# Patient Record
Sex: FEMALE | Race: BLACK OR AFRICAN AMERICAN | NOT HISPANIC OR LATINO | URBAN - METROPOLITAN AREA
[De-identification: names, ages, dates, MRNs, and addresses within clinical notes are randomized per-mention and may not be internally consistent; named-entity substitution may affect disease eponyms.]

---

## 2017-11-09 ENCOUNTER — EMERGENCY (EMERGENCY)
Facility: HOSPITAL | Age: 32
LOS: 0 days | Discharge: HOME | End: 2017-11-09

## 2017-11-09 DIAGNOSIS — M79.671 PAIN IN RIGHT FOOT: ICD-10-CM

## 2017-11-09 DIAGNOSIS — Z87.891 PERSONAL HISTORY OF NICOTINE DEPENDENCE: ICD-10-CM

## 2017-11-09 DIAGNOSIS — M10.9 GOUT, UNSPECIFIED: ICD-10-CM

## 2018-09-24 ENCOUNTER — OUTPATIENT (OUTPATIENT)
Dept: OUTPATIENT SERVICES | Facility: HOSPITAL | Age: 33
LOS: 1 days | Discharge: HOME | End: 2018-09-24

## 2018-09-24 DIAGNOSIS — F32.9 MAJOR DEPRESSIVE DISORDER, SINGLE EPISODE, UNSPECIFIED: ICD-10-CM

## 2018-09-24 DIAGNOSIS — F41.1 GENERALIZED ANXIETY DISORDER: ICD-10-CM

## 2018-10-26 ENCOUNTER — OUTPATIENT (OUTPATIENT)
Dept: OUTPATIENT SERVICES | Facility: HOSPITAL | Age: 33
LOS: 1 days | Discharge: HOME | End: 2018-10-26

## 2018-10-26 DIAGNOSIS — F41.1 GENERALIZED ANXIETY DISORDER: ICD-10-CM

## 2018-10-26 DIAGNOSIS — F32.9 MAJOR DEPRESSIVE DISORDER, SINGLE EPISODE, UNSPECIFIED: ICD-10-CM

## 2018-11-30 ENCOUNTER — OUTPATIENT (OUTPATIENT)
Dept: OUTPATIENT SERVICES | Facility: HOSPITAL | Age: 33
LOS: 1 days | Discharge: HOME | End: 2018-11-30

## 2018-11-30 DIAGNOSIS — F41.1 GENERALIZED ANXIETY DISORDER: ICD-10-CM

## 2018-11-30 DIAGNOSIS — F32.9 MAJOR DEPRESSIVE DISORDER, SINGLE EPISODE, UNSPECIFIED: ICD-10-CM

## 2018-12-24 ENCOUNTER — OUTPATIENT (OUTPATIENT)
Dept: OUTPATIENT SERVICES | Facility: HOSPITAL | Age: 33
LOS: 1 days | Discharge: HOME | End: 2018-12-24

## 2018-12-24 DIAGNOSIS — F41.1 GENERALIZED ANXIETY DISORDER: ICD-10-CM

## 2018-12-24 DIAGNOSIS — F32.9 MAJOR DEPRESSIVE DISORDER, SINGLE EPISODE, UNSPECIFIED: ICD-10-CM

## 2019-01-04 ENCOUNTER — OUTPATIENT (OUTPATIENT)
Dept: OUTPATIENT SERVICES | Facility: HOSPITAL | Age: 34
LOS: 1 days | Discharge: HOME | End: 2019-01-04

## 2019-01-04 DIAGNOSIS — F32.9 MAJOR DEPRESSIVE DISORDER, SINGLE EPISODE, UNSPECIFIED: ICD-10-CM

## 2019-01-04 DIAGNOSIS — F41.1 GENERALIZED ANXIETY DISORDER: ICD-10-CM

## 2019-01-11 ENCOUNTER — OUTPATIENT (OUTPATIENT)
Dept: OUTPATIENT SERVICES | Facility: HOSPITAL | Age: 34
LOS: 1 days | Discharge: HOME | End: 2019-01-11

## 2019-01-11 DIAGNOSIS — F32.9 MAJOR DEPRESSIVE DISORDER, SINGLE EPISODE, UNSPECIFIED: ICD-10-CM

## 2019-01-11 DIAGNOSIS — F41.1 GENERALIZED ANXIETY DISORDER: ICD-10-CM

## 2019-01-18 ENCOUNTER — OUTPATIENT (OUTPATIENT)
Dept: OUTPATIENT SERVICES | Facility: HOSPITAL | Age: 34
LOS: 1 days | Discharge: HOME | End: 2019-01-18

## 2019-01-18 DIAGNOSIS — F32.9 MAJOR DEPRESSIVE DISORDER, SINGLE EPISODE, UNSPECIFIED: ICD-10-CM

## 2019-01-18 DIAGNOSIS — F41.1 GENERALIZED ANXIETY DISORDER: ICD-10-CM

## 2019-01-25 ENCOUNTER — OUTPATIENT (OUTPATIENT)
Dept: OUTPATIENT SERVICES | Facility: HOSPITAL | Age: 34
LOS: 1 days | Discharge: HOME | End: 2019-01-25

## 2019-01-25 DIAGNOSIS — F41.1 GENERALIZED ANXIETY DISORDER: ICD-10-CM

## 2019-01-25 DIAGNOSIS — F32.9 MAJOR DEPRESSIVE DISORDER, SINGLE EPISODE, UNSPECIFIED: ICD-10-CM

## 2019-02-01 ENCOUNTER — OUTPATIENT (OUTPATIENT)
Dept: OUTPATIENT SERVICES | Facility: HOSPITAL | Age: 34
LOS: 1 days | Discharge: HOME | End: 2019-02-01

## 2019-02-01 DIAGNOSIS — F32.9 MAJOR DEPRESSIVE DISORDER, SINGLE EPISODE, UNSPECIFIED: ICD-10-CM

## 2019-02-01 DIAGNOSIS — F41.1 GENERALIZED ANXIETY DISORDER: ICD-10-CM

## 2019-02-08 ENCOUNTER — OUTPATIENT (OUTPATIENT)
Dept: OUTPATIENT SERVICES | Facility: HOSPITAL | Age: 34
LOS: 1 days | Discharge: HOME | End: 2019-02-08

## 2019-02-08 DIAGNOSIS — F32.9 MAJOR DEPRESSIVE DISORDER, SINGLE EPISODE, UNSPECIFIED: ICD-10-CM

## 2019-02-08 DIAGNOSIS — F41.1 GENERALIZED ANXIETY DISORDER: ICD-10-CM

## 2019-02-22 ENCOUNTER — OUTPATIENT (OUTPATIENT)
Dept: OUTPATIENT SERVICES | Facility: HOSPITAL | Age: 34
LOS: 1 days | Discharge: HOME | End: 2019-02-22

## 2019-02-22 DIAGNOSIS — F32.9 MAJOR DEPRESSIVE DISORDER, SINGLE EPISODE, UNSPECIFIED: ICD-10-CM

## 2019-02-22 DIAGNOSIS — F41.1 GENERALIZED ANXIETY DISORDER: ICD-10-CM

## 2019-03-01 ENCOUNTER — OUTPATIENT (OUTPATIENT)
Dept: OUTPATIENT SERVICES | Facility: HOSPITAL | Age: 34
LOS: 1 days | Discharge: HOME | End: 2019-03-01

## 2019-03-01 DIAGNOSIS — F41.1 GENERALIZED ANXIETY DISORDER: ICD-10-CM

## 2019-03-01 DIAGNOSIS — F32.9 MAJOR DEPRESSIVE DISORDER, SINGLE EPISODE, UNSPECIFIED: ICD-10-CM

## 2019-03-08 ENCOUNTER — OUTPATIENT (OUTPATIENT)
Dept: OUTPATIENT SERVICES | Facility: HOSPITAL | Age: 34
LOS: 1 days | Discharge: HOME | End: 2019-03-08

## 2019-03-08 DIAGNOSIS — F41.1 GENERALIZED ANXIETY DISORDER: ICD-10-CM

## 2019-03-08 DIAGNOSIS — F32.9 MAJOR DEPRESSIVE DISORDER, SINGLE EPISODE, UNSPECIFIED: ICD-10-CM

## 2019-03-22 ENCOUNTER — OUTPATIENT (OUTPATIENT)
Dept: OUTPATIENT SERVICES | Facility: HOSPITAL | Age: 34
LOS: 1 days | Discharge: HOME | End: 2019-03-22

## 2019-03-22 DIAGNOSIS — F41.1 GENERALIZED ANXIETY DISORDER: ICD-10-CM

## 2019-03-22 DIAGNOSIS — F32.9 MAJOR DEPRESSIVE DISORDER, SINGLE EPISODE, UNSPECIFIED: ICD-10-CM

## 2019-03-29 ENCOUNTER — OUTPATIENT (OUTPATIENT)
Dept: OUTPATIENT SERVICES | Facility: HOSPITAL | Age: 34
LOS: 1 days | Discharge: HOME | End: 2019-03-29

## 2019-03-29 DIAGNOSIS — F41.1 GENERALIZED ANXIETY DISORDER: ICD-10-CM

## 2019-03-29 DIAGNOSIS — F32.9 MAJOR DEPRESSIVE DISORDER, SINGLE EPISODE, UNSPECIFIED: ICD-10-CM

## 2019-04-05 ENCOUNTER — OUTPATIENT (OUTPATIENT)
Dept: OUTPATIENT SERVICES | Facility: HOSPITAL | Age: 34
LOS: 1 days | Discharge: HOME | End: 2019-04-05

## 2019-04-05 DIAGNOSIS — F41.1 GENERALIZED ANXIETY DISORDER: ICD-10-CM

## 2019-04-05 DIAGNOSIS — F32.9 MAJOR DEPRESSIVE DISORDER, SINGLE EPISODE, UNSPECIFIED: ICD-10-CM

## 2019-04-12 ENCOUNTER — OUTPATIENT (OUTPATIENT)
Dept: OUTPATIENT SERVICES | Facility: HOSPITAL | Age: 34
LOS: 1 days | Discharge: HOME | End: 2019-04-12

## 2019-04-12 DIAGNOSIS — F32.9 MAJOR DEPRESSIVE DISORDER, SINGLE EPISODE, UNSPECIFIED: ICD-10-CM

## 2019-04-12 DIAGNOSIS — F41.1 GENERALIZED ANXIETY DISORDER: ICD-10-CM

## 2019-05-03 ENCOUNTER — OUTPATIENT (OUTPATIENT)
Dept: OUTPATIENT SERVICES | Facility: HOSPITAL | Age: 34
LOS: 1 days | Discharge: HOME | End: 2019-05-03

## 2019-05-03 DIAGNOSIS — F41.1 GENERALIZED ANXIETY DISORDER: ICD-10-CM

## 2019-05-03 DIAGNOSIS — F32.9 MAJOR DEPRESSIVE DISORDER, SINGLE EPISODE, UNSPECIFIED: ICD-10-CM

## 2019-05-10 ENCOUNTER — OUTPATIENT (OUTPATIENT)
Dept: OUTPATIENT SERVICES | Facility: HOSPITAL | Age: 34
LOS: 1 days | Discharge: HOME | End: 2019-05-10

## 2019-05-10 DIAGNOSIS — F41.1 GENERALIZED ANXIETY DISORDER: ICD-10-CM

## 2019-05-10 DIAGNOSIS — F32.9 MAJOR DEPRESSIVE DISORDER, SINGLE EPISODE, UNSPECIFIED: ICD-10-CM

## 2019-05-17 ENCOUNTER — OUTPATIENT (OUTPATIENT)
Dept: OUTPATIENT SERVICES | Facility: HOSPITAL | Age: 34
LOS: 1 days | Discharge: HOME | End: 2019-05-17
Payer: MEDICAID

## 2019-05-17 DIAGNOSIS — F43.10 POST-TRAUMATIC STRESS DISORDER, UNSPECIFIED: ICD-10-CM

## 2019-05-17 DIAGNOSIS — F33.1 MAJOR DEPRESSIVE DISORDER, RECURRENT, MODERATE: ICD-10-CM

## 2019-05-17 PROCEDURE — 99214 OFFICE O/P EST MOD 30 MIN: CPT

## 2019-05-24 ENCOUNTER — OUTPATIENT (OUTPATIENT)
Dept: OUTPATIENT SERVICES | Facility: HOSPITAL | Age: 34
LOS: 1 days | Discharge: HOME | End: 2019-05-24

## 2019-05-24 DIAGNOSIS — F43.10 POST-TRAUMATIC STRESS DISORDER, UNSPECIFIED: ICD-10-CM

## 2019-05-24 DIAGNOSIS — F33.1 MAJOR DEPRESSIVE DISORDER, RECURRENT, MODERATE: ICD-10-CM

## 2019-06-14 ENCOUNTER — OUTPATIENT (OUTPATIENT)
Dept: OUTPATIENT SERVICES | Facility: HOSPITAL | Age: 34
LOS: 1 days | Discharge: HOME | End: 2019-06-14

## 2019-06-14 DIAGNOSIS — F43.10 POST-TRAUMATIC STRESS DISORDER, UNSPECIFIED: ICD-10-CM

## 2019-06-14 DIAGNOSIS — F33.1 MAJOR DEPRESSIVE DISORDER, RECURRENT, MODERATE: ICD-10-CM

## 2019-07-02 ENCOUNTER — OUTPATIENT (OUTPATIENT)
Dept: OUTPATIENT SERVICES | Facility: HOSPITAL | Age: 34
LOS: 1 days | Discharge: HOME | End: 2019-07-02

## 2019-07-02 DIAGNOSIS — F33.1 MAJOR DEPRESSIVE DISORDER, RECURRENT, MODERATE: ICD-10-CM

## 2019-07-02 DIAGNOSIS — F43.10 POST-TRAUMATIC STRESS DISORDER, UNSPECIFIED: ICD-10-CM

## 2019-07-26 ENCOUNTER — OUTPATIENT (OUTPATIENT)
Dept: OUTPATIENT SERVICES | Facility: HOSPITAL | Age: 34
LOS: 1 days | Discharge: HOME | End: 2019-07-26
Payer: MEDICAID

## 2019-07-26 PROCEDURE — 99214 OFFICE O/P EST MOD 30 MIN: CPT

## 2019-09-19 ENCOUNTER — OUTPATIENT (OUTPATIENT)
Dept: OUTPATIENT SERVICES | Facility: HOSPITAL | Age: 34
LOS: 1 days | Discharge: HOME | End: 2019-09-19

## 2019-09-19 DIAGNOSIS — F43.10 POST-TRAUMATIC STRESS DISORDER, UNSPECIFIED: ICD-10-CM

## 2019-09-19 DIAGNOSIS — F33.1 MAJOR DEPRESSIVE DISORDER, RECURRENT, MODERATE: ICD-10-CM

## 2019-11-16 ENCOUNTER — EMERGENCY (EMERGENCY)
Facility: HOSPITAL | Age: 34
LOS: 0 days | Discharge: HOME | End: 2019-11-16
Admitting: EMERGENCY MEDICINE
Payer: MEDICAID

## 2019-11-16 VITALS
DIASTOLIC BLOOD PRESSURE: 91 MMHG | SYSTOLIC BLOOD PRESSURE: 152 MMHG | RESPIRATION RATE: 17 BRPM | HEART RATE: 90 BPM | TEMPERATURE: 98 F | OXYGEN SATURATION: 100 %

## 2019-11-16 DIAGNOSIS — F17.200 NICOTINE DEPENDENCE, UNSPECIFIED, UNCOMPLICATED: ICD-10-CM

## 2019-11-16 DIAGNOSIS — M25.561 PAIN IN RIGHT KNEE: ICD-10-CM
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
## 2019-11-16 DIAGNOSIS — S89.90XA UNSPECIFIED INJURY OF UNSPECIFIED LOWER LEG, INITIAL ENCOUNTER: ICD-10-CM

## 2019-11-16 PROCEDURE — 73562 X-RAY EXAM OF KNEE 3: CPT | Mod: 26,RT

## 2019-11-16 PROCEDURE — 99283 EMERGENCY DEPT VISIT LOW MDM: CPT

## 2019-11-16 RX ORDER — IBUPROFEN 200 MG
600 TABLET ORAL ONCE
Refills: 0 | Status: COMPLETED | OUTPATIENT
Start: 2019-11-16 | End: 2019-11-16

## 2019-11-16 RX ADMIN — Medication 600 MILLIGRAM(S): at 11:38

## 2019-11-16 NOTE — ED PROVIDER NOTE - OBJECTIVE STATEMENT
34 y/o female with a PMH of gout, arthritis, asthma, and hypercholesterolemia presents to the ED for evaluation of constant, throbbing, right knee pain x 2 days. Patient states she woke up yesterday with the right knee pain and it has gradually worsened, and is minimally alleviated with ibuprofen. Patient denies recent trauma, fevers, chills, nausea, vomiting, rash 32 y/o female with a PMH of gout, arthritis, asthma, and hypercholesterolemia presents to the ED for evaluation of constant, throbbing, right knee pain x 2 days. Patient states she woke up yesterday with the right knee pain and it has gradually worsened, and is minimally alleviated with ibuprofen. Patient denies recent trauma, fevers, chills, nausea, vomiting, rash, knee surgery, no hx of clots, no fhx of clots, no fhx of cad or sudden cardiac death, and no lower extremity swelling.

## 2019-11-16 NOTE — ED PROVIDER NOTE - NS ED ROS FT
CONST: No fever, chills or bodyaches  EYES: No pain, redness, drainage or visual changes.  ENT: No ear pain or discharge, nasal discharge or congestion. No sore throat  CARD: No chest pain, palpitations  RESP: No SOB, cough, hemoptysis. No hx of asthma or COPD  GI: No abdominal pain, N/V/D  MS: atraumatic right knee pain. No back pain or pain/injury  SKIN: No rashes  NEURO: No headache, dizziness, or paresthesias CONST: No fever, chills or bodyaches  CARD: No chest pain, palpitations  RESP: No SOB, cough, hemoptysis. No hx of asthma or COPD  GI: No abdominal pain, N/V/D  MS: atraumatic right knee pain. No back pain or pain/injury  SKIN: No rashes  NEURO: No headache, dizziness, or paresthesias

## 2019-11-16 NOTE — ED PROVIDER NOTE - NSFOLLOWUPINSTRUCTIONS_ED_ALL_ED_FT
Knee Pain    WHAT YOU NEED TO KNOW:    What do I need to know about knee pain? Knee pain may start suddenly, or it may be a long-term problem. You may have pain on the side, front, or back of your knee. You may have knee stiffness and swelling. You may hear popping sounds or feel like your knee is giving way or locking up as you walk. You may feel pain when you sit, stand, walk, or climb up and down stairs.    What increases my risk for knee pain?     Obesity      A strain or tear in ligaments or tendons      A leg fracture or knee dislocation      Overuse of your knee      Osteoarthritis, rheumatoid arthritis, or gout      An infection, tumor, or cyst in your knee      Shoes that are not supportive, or training on a hard surface      Sports that involve jumping or pivoting on your knee    How is the cause of knee pain diagnosed? Your healthcare provider will examine your knee and ask about your symptoms. Tell your provider when the pain started and what you were doing at the time. Describe the pain, such as sharp, throbbing, or achy. Tell your provider about any knee injury or surgery you had. You may need any of the following:     MRI, CT, or ultrasound pictures may show an injury, fracture, or tumor.       Blood tests may be used to check the level of inflammation in your blood. The tests may also show signs of infection.      Arthroscopy is a procedure to look inside your knee joint with an arthroscope. An arthroscope is a flexible tube with a light and camera on the end. A knee arthroscopy is usually done to check for disease or damage inside your knee. These problems may be fixed during the procedure.    How is knee pain treated? Treatment will depend on the cause of your pain. You may need any of the following:     NSAIDs help decrease swelling and pain or fever. This medicine is available with or without a doctor's order. NSAIDs can cause stomach bleeding or kidney problems in certain people. If you take blood thinner medicine, always ask your healthcare provider if NSAIDs are safe for you. Always read the medicine label and follow directions.      Acetaminophen decreases pain and fever. It is available without a doctor's order. Ask how much to take and how often to take it. Follow directions. Read the labels of all other medicines you are using to see if they also contain acetaminophen, or ask your doctor or pharmacist. Acetaminophen can cause liver damage if not taken correctly. Do not use more than 4 grams (4,000 milligrams) total of acetaminophen in one day.       Prescription pain medicine may be given. Ask your healthcare provider how to take this medicine safely. Some prescription pain medicines contain acetaminophen. Do not take other medicines that contain acetaminophen without talking to your healthcare provider. Too much acetaminophen may cause liver damage. Prescription pain medicine may cause constipation. Ask your healthcare provider how to prevent or treat constipation.       Steroid injections may be given into your knee. Steroids reduce inflammation and pain.      Surgery may be used for some injuries, such as to repair a torn ACL.    What can I do to manage my symptoms?     Rest your knee so it can heal. Limit activities that increase your pain. Do low-impact exercises, such as walking or swimming.       Apply ice to help reduce swelling and pain. Use an ice pack, or put crushed ice in a plastic bag. Cover it with a towel before you apply it to your knee. Apply ice for 15 to 20 minutes every hour, or as directed.      Apply compression to help reduce swelling. Use a brace or bandage only as directed.      Elevate your knee to help decrease pain and swelling. Elevate your knee while you are sitting or lying down. Prop your leg on pillows to keep your knee above the level of your heart.      Prevent your knee from moving as directed. Your healthcare provider may put on a cast or splint. You may need to wear a leg brace to stabilize your knee. A leg brace can be adjusted to increase your range of motion as your knee heals.Hinged Knee Braces          What can I do to prevent knee pain?     Maintain a healthy weight. Extra weight increases your risk for knee pain. Ask your healthcare provider how much you should weigh. He or she can help you create a safe weight loss plan if you need to lose weight.      Exercise or train properly. Use the correct equipment for sports. Wear shoes that provide good support. Check your posture often as you exercise, play sports, or train for an event. This can help prevent stress and strain on your knees. Rest between sessions so you do not overwork your knees.    When should I seek immediate care?     Your pain is worse, even after treatment.       You cannot bend or straighten your leg completely.       The swelling around your knee does not go down even with treatment.      Your knee is painful and hot to the touch.     When should I contact my healthcare provider?     You have questions or concerns about your condition or care.         CARE AGREEMENT:    You have the right to help plan your care. Learn about your health condition and how it may be treated. Discuss treatment options with your healthcare providers to decide what care you want to receive. You always have the right to refuse treatment.

## 2019-11-16 NOTE — ED PROVIDER NOTE - CARE PROVIDER_API CALL
Kike Thomas (MD)  Orthopaedic Surgery  [address] [place] [address]  Phone: [phone]  Fax: [phone]  Follow Up Time:

## 2019-11-16 NOTE — ED PROVIDER NOTE - PATIENT PORTAL LINK FT
You can access the FollowMyHealth Patient Portal offered by [facility] by registering at the following website: [email or web address]. By joining [facility]’s FollowMyHealth portal, you will also be able to view your health information using other applications (apps) compatible with our system.

## 2019-11-16 NOTE — ED PROVIDER NOTE - NSFOLLOWUPCLINICS_GEN_ALL_ED_FT
[facility]  Orthpedic  [address], [place]   Phone: [phone]  Fax:   Follow Up Time:

## 2019-11-16 NOTE — ED PROVIDER NOTE - PHYSICAL EXAMINATION
CONST: Well appearing in NAD  EYES: Sclera and conjunctiva clear.   CARD: Normal S1 S2; Normal rate and rhythm  RESP: Equal BS B/L, No wheezes, rhonchi or rales. No distress  MS: pain with flexion and extension of the Normal ROM in all extremities.   SKIN: Warm, dry, no acute rashes. Good turgor  NEURO: A&Ox3, No focal deficits. Strength 5/5 with no sensory deficits. Steady gait CONST: Well appearing in NAD  EYES: Sclera and conjunctiva clear.   CARD: Normal S1 S2; Normal rate and rhythm  RESP: Equal BS B/L, No wheezes, rhonchi or rales. No distress  MS: pain with flexion and extension of the right knee. tenderness to medial and lateral aspect of the right knee with superior lateral swelling of the right knee. no gross deformity. no crepitus. no erythema or warmth. Normal ROM in right ankle. no calf edema. 2+ right dorsalis pedis pulse.  SKIN: Warm, dry, no acute rashes.   NEURO: A&Ox3, No focal deficits. Strength 5/5 with no sensory deficits.

## 2021-05-11 ENCOUNTER — EMERGENCY (EMERGENCY)
Facility: HOSPITAL | Age: 36
LOS: 0 days | Discharge: HOME | End: 2021-05-11
Attending: EMERGENCY MEDICINE | Admitting: EMERGENCY MEDICINE
Payer: MEDICAID

## 2021-05-11 VITALS
TEMPERATURE: 99 F | OXYGEN SATURATION: 100 % | RESPIRATION RATE: 20 BRPM | WEIGHT: 184.97 LBS | DIASTOLIC BLOOD PRESSURE: 87 MMHG | HEART RATE: 83 BPM | SYSTOLIC BLOOD PRESSURE: 150 MMHG

## 2021-05-11 DIAGNOSIS — Z98.890 OTHER SPECIFIED POSTPROCEDURAL STATES: ICD-10-CM

## 2021-05-11 DIAGNOSIS — K06.8 OTHER SPECIFIED DISORDERS OF GINGIVA AND EDENTULOUS ALVEOLAR RIDGE: ICD-10-CM

## 2021-05-11 PROCEDURE — 99283 EMERGENCY DEPT VISIT LOW MDM: CPT

## 2021-05-11 NOTE — ED PROVIDER NOTE - OBJECTIVE STATEMENT
pt presents to ED c/o persistent bleeding right upper gum s/p extraction of 4 teeth this AM. Denies fever/chill/HA/dizziness/chest pain/palpitation/sob/abd pain/n/v/d/ black stool/bloody stool/urinary sxs

## 2021-05-11 NOTE — ED ADULT TRIAGE NOTE - CHIEF COMPLAINT QUOTE
pt biba c/o pt had right top 4 teeth extracted today, pt states "the bleeding has not stopped", pt not on anticoagulation.

## 2021-05-11 NOTE — ED ADULT NURSE NOTE - IS THE PATIENT ABLE TO BE SCREENED?
Anticipated Discharge Disposition:   Group home with home health, wound vac    Action:   RN JOAQUÍN received email from pt's Legal Guardian, Aletha Grady (office / cell [phone]). Per Aletha, she is in communication with the  to talk about pricing and arrangements, and will update this RN JOAQUÍN. Informed [facility] via email about need for wound vac at discharge, PCP, home health, and [facility] information.     Barriers to Discharge:   Group home acceptance  Establish PCP  Home health acceptance  Wound vac set up and delivery    Plan:   Follow up with Legal Guardian.  Hospital Care Management will continue to follow and assist with discharge planning needs.         Yes

## 2021-05-11 NOTE — ED ADULT NURSE NOTE - OBJECTIVE STATEMENT
Pt c/o persistent bleeding right upper gum s/p extraction of 4 teeth this AM. Denies fever/chill/HA/dizziness/chest pain/palpitation/sob/abd pain/n/v/d/ black stool/bloody stool/urinary symptoms.

## 2021-05-11 NOTE — ED PROVIDER NOTE - DATE/TIME 2
11-May-2021 16:55 Skin Substitute Text: Given the location of the defect, shape of the defect and the proximity to free margins a skin substitute graft was deemed most appropriate.  The graft material was trimmed to fit the size of the defect. The graft was then placed in the primary defect and oriented appropriately.

## 2021-05-11 NOTE — ED PROVIDER NOTE - PHYSICAL EXAMINATION
CONSTITUTIONAL: Well-appearing; well-nourished; in no apparent distress.   ENT: normal nose; no rhinorrhea; normal pharynx with no tonsillar hypertrophy. bleeding to gums right upper post extraction  SKIN: Normal for age and race; warm; dry; good turgor; no apparent lesions or exudate.   NEURO/PSYCH: A & O x 4; grossly unremarkable. mood and manner are appropriate.

## 2021-05-11 NOTE — ED PROVIDER NOTE - ATTENDING CONTRIBUTION TO CARE
35F PMH asthma, p/w bleeding gums. states she had 4 teeth pulled this morning at a dentist in [facility], to R upper gums. has been oozing blood ever since and wants to make sure its normal. wearing gauze/putting pressure. no numbness, weakness. no cp, sob. no blood thinners. no pain. states she was started on amoxicillin.    on exam, AFVSS, well stacia nad, ncat, eomi, perrla, mmm, R upper post gums w dark red clots, aaox3, no focal deficits, no le edema or calf ttp,     a/p; Bleeding gums s/p resection, dispo to dental clinic for eval by dentist.

## 2021-05-11 NOTE — CONSULT NOTE ADULT - SUBJECTIVE AND OBJECTIVE BOX
Patient is a 35y old  Female who presents with a chief complaint of bleeding and pain after extraction    HPI: patient got #2,3,4 extracted this morning at 9:30am at an oral surgeon under sedation. She reported it didnt stop bleeding and she has post-operative pain. She reported smoking a few cigarettes after extraction. Patient denies taking any blood thinners. Patient received amoxicillin and ibuprofen from oral surgeon.      PAST MEDICAL & SURGICAL HISTORY:  asthma      (  - ) heart valve replacement  (  - ) joint replacement  (  - ) pregnancy    MEDICATIONS  (STANDING):    MEDICATIONS  (PRN):      Allergies    No Known Allergies    Intolerances        FAMILY HISTORY:      *SOCIAL HISTORY: (+   ) Tobacco; (   ) ETOH    *Last Dental Visit:    Vital Signs Last 24 Hrs  T(C): 37.3 (11 May 2021 16:16), Max: 37.3 (11 May 2021 16:16)  T(F): 99.2 (11 May 2021 16:16), Max: 99.2 (11 May 2021 16:16)  HR: 83 (11 May 2021 16:16) (83 - 83)  BP: 150/87 (11 May 2021 16:16) (150/87 - 150/87)  BP(mean): --  RR: 20 (11 May 2021 16:16) (20 - 20)  SpO2: 100% (11 May 2021 16:16) (100% - 100%)            EOE:  TMJ (-   ) clicks                     (-   ) pops                     ( -  ) crepitus             Mandible <<FROM>>             Facial bones and MOM <<grossly intact>>             (-   ) trismus             ( -  ) lymphadenopathy             ( -  ) swelling             ( -  ) asymmetry             (-   ) palpation             (-   ) dyspnea             ( -  ) dysphagia             (  - ) loss of consciousness    IOE:  permanent dentition: <<multiple missing teeth>>           hard/soft palate:  (   ) palatal torus, <<No pathology noted>>           tongue/FOM <<No pathology noted>>           labial/buccal mucosa <<No pathology noted>>           (   ) percussion           (   ) palpation           ( - ) swelling            ( -  ) abscess           ( -  ) sinus tract            *DENTAL RADIOGRAPHS: 1 periapical  RADIOLOGY & ADDITIONAL STUDIES:    *ASSESSMENT: #2,3,4 extraction sites appear to be actively bleeding, and buccal and lingual gingiva widely opened      *PLAN: informed patient gel form needs to be placed and extraction sites need to be sutured to control bleeding. However patient refused treatment saying that she is afraid of needles and rather not get anything done.     Procedure:  Gauze was placed in extraction site and informed patient to bite on with pressure.    Also, reinforced patient about post-operative instructions and not to smoke.      RECOMMENDATIONS:  1) Follow post-operative instructions, take pain medications and antibiotics as prescribed   2) Dental F/U with outpatient dentist for comprehensive dental care.   3) If any difficulty swallowing/breathing, fever occur, return to ER.     Chelo Jennings DDS, pager #[phone]

## 2022-08-01 ENCOUNTER — EMERGENCY (EMERGENCY)
Facility: HOSPITAL | Age: 37
LOS: 0 days | Discharge: HOME | End: 2022-08-01
Attending: STUDENT IN AN ORGANIZED HEALTH CARE EDUCATION/TRAINING PROGRAM | Admitting: STUDENT IN AN ORGANIZED HEALTH CARE EDUCATION/TRAINING PROGRAM

## 2022-08-01 VITALS
SYSTOLIC BLOOD PRESSURE: 134 MMHG | DIASTOLIC BLOOD PRESSURE: 71 MMHG | WEIGHT: 179.9 LBS | RESPIRATION RATE: 18 BRPM | TEMPERATURE: 98 F | HEART RATE: 96 BPM | OXYGEN SATURATION: 99 % | HEIGHT: 67 IN

## 2022-08-01 DIAGNOSIS — G89.18 OTHER ACUTE POSTPROCEDURAL PAIN: ICD-10-CM

## 2022-08-01 DIAGNOSIS — M25.512 PAIN IN LEFT SHOULDER: ICD-10-CM

## 2022-08-01 DIAGNOSIS — S82.892A OTHER FRACTURE OF LEFT LOWER LEG, INITIAL ENCOUNTER FOR CLOSED FRACTURE: ICD-10-CM

## 2022-08-01 DIAGNOSIS — M25.511 PAIN IN RIGHT SHOULDER: ICD-10-CM

## 2022-08-01 DIAGNOSIS — Z98.890 OTHER SPECIFIED POSTPROCEDURAL STATES: ICD-10-CM
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
## 2022-08-01 DIAGNOSIS — M25.572 PAIN IN LEFT ANKLE AND JOINTS OF LEFT FOOT: ICD-10-CM
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
## 2022-08-01 PROBLEM — Z78.9 OTHER SPECIFIED HEALTH STATUS: Chronic | Status: ACTIVE | Noted: 2021-05-11

## 2022-08-01 PROCEDURE — 29515 APPLICATION SHORT LEG SPLINT: CPT

## 2022-08-01 PROCEDURE — 99284 EMERGENCY DEPT VISIT MOD MDM: CPT | Mod: 25

## 2022-08-01 PROCEDURE — 73610 X-RAY EXAM OF ANKLE: CPT | Mod: 26,LT

## 2022-08-01 RX ORDER — ACETAMINOPHEN 500 MG
975 TABLET ORAL ONCE
Refills: 0 | Status: COMPLETED | OUTPATIENT
Start: 2022-08-01 | End: 2022-08-01

## 2022-08-01 RX ADMIN — Medication 975 MILLIGRAM(S): at 07:46

## 2022-08-01 NOTE — ED PROVIDER NOTE - PHYSICAL EXAMINATION
CONSTITUTIONAL: well-appearing, in NAD  SKIN: Warm dry, normal skin turgor  HEAD: NCAT  EYES: EOMI, PERRLA, no scleral icterus, conjunctiva pink  ENT: normal pharynx with no erythema or exudates  NECK: Supple; non tender. Full ROM.  CARD: RRR, no murmurs.  RESP: clear to ausculation b/l. No crackles or wheezing.  ABD: soft, non-tender, non-distended, no rebound or guarding.  EXT: Full ROM, no bony tenderness, no pedal edema, no calf tenderness; well healing surgical sutures, no erythema, drainage, or tenderness  NEURO: normal motor. normal sensory. Normal gait.  PSYCH: Cooperative, appropriate. CONSTITUTIONAL: well-appearing, in NAD  SKIN: Warm dry, normal skin turgor  HEAD: NCAT  EYES: EOMI, PERRLA, no scleral icterus, conjunctiva pink  ENT: normal pharynx with no erythema or exudates  NECK: Supple; non tender. Full ROM.  CARD: RRR, no murmurs.  RESP: clear to ausculation b/l. No crackles or wheezing.  ABD: soft, non-tender, non-distended, no rebound or guarding.  EXT: Full ROM, no bony tenderness, no pedal edema, no calf tenderness; well healing surgical sutures, no erythema, drainage, or tenderness, cap refill <2 seconds, DP 2+ b/l  NEURO: normal motor. normal sensory. Normal gait.  PSYCH: Cooperative, appropriate.

## 2022-08-01 NOTE — ED PROVIDER NOTE - NSFOLLOWUPINSTRUCTIONS_ED_ALL_ED_FT
Ankle Fracture  The ankle joint is made up of the lower (distal) sections of your lower leg bones (tibia and fibula) along with a bone in your foot (talus). An ankle fracture is a break in one, two, or all three of these sections of bone. There are two general types of ankle fractures:  Stable fracture. This happens when one of your bones is broken, but the bones of your ankle joint stay in their normal positions.Unstable fracture. This type can include more than one broken bone. It can also happen if your outer bone is broken and the tough bands of tissue that connect bones (ligaments) are also injured at your inner ankle. This type of fracture allows the talus to move out of its normal position.What are the causes?  This condition may be caused by:  A hard, direct hit (blow) to the ankle.Quickly and severely twisting your ankle, often while your foot is planted and the rest of your body moving.Trauma, such as a car accident or falling from a height.What increases the risk?  This condition is more likely to occur in people who:  Smoke.Are overweight.Participate in sports that involve quick direction changes, as in soccer.Do high-impact sports like gymnastics or football.Are involved in a high-impact car accident.What are the signs or symptoms?  Symptoms of this condition include:  Tender and swollen ankle.Bruising around the injured ankle.Pain when moving or pressing on the ankle.Trouble walking or using the ankle to support your body weight (putting weight on the ankle).Pain that gets worse when moving or standing and gets better with rest.How is this diagnosed?  An ankle fracture is usually diagnosed with a physical exam and X-rays. A CT scan or MRI may also be done.  How is this treated?  Treatment for this condition depends on the type of ankle fracture you have. Stable fractures are treated with a cast, boot, or splint to hold the ankle still and crutches to avoid putting weight on the injured ankle until the fracture heals. Unstable fractures require surgery to ensure that the bones heal properly. After surgery, you will have a splint. After your incision is healed, your surgeon may give you a cast or a boot. You will not be able to put weight on your injured side for several weeks.  After your ankle has healed, you will do exercises to improve the strength and mobility of your ankle.  Follow these instructions at home:  If you have a splint:     Wear the splint as told by your health care provider. Remove it only as told by your health care provider.Loosen the splint if your toes tingle, become numb, or turn cold and blue.Keep the splint clean.If the splint is not waterproof:  Do not let it get wet.Cover it with a watertight covering when you take a bath or a shower.If you have a cast:     Do not stick anything inside the cast to scratch your skin. Doing that increases your risk of infection.Check the skin around the cast every day. Tell your health care provider about any concerns.You may put lotion on dry skin around the edges of the cast. Do not put lotion on the skin underneath the cast.Keep the cast clean.If the cast is not waterproof:  Do not let it get wet.Cover it with a watertight covering when you take a bath or a shower.Managing pain, stiffness, and swelling        If directed, put ice on the injured area:  If you have a removable splint, remove it as told by your health care provider.Put ice in a plastic bag.Place a towel between your skin and the bag or between your cast and the bag.Leave the ice on for 20 minutes, 2–3 times a day.Move your toes often to avoid stiffness and to lessen swelling.Raise (elevate) the injured area above the level of your heart while you are sitting or lying down.General instructions     Do not use the injured limb to support your body weight until your health care provider says that you can. Use crutches as told by your health care providerTake over-the-counter and prescription medicines only as told by your health care provider.Ask your health care provider when it is safe to drive if you have a cast or splint.Do exercises as told by your health care provider.Do not use any products that contain nicotine or tobacco, such as cigarettes and e-cigarettes. These can delay bone healing. If you need help quitting, ask your health care providerKeep all follow-up visits as told by your health care provider. This is important.Contact a health care provider if:  You have pain or swelling that gets worse or does not get better with rest or medicine.Get help right away if:  Your cast gets damaged.You have severe pain that lasts.You develop new pain or swelling.Your skin or toenails below the injury turn blue or gray, feel cold, become numb, or have a loss of sensitivity to touch.Summary  An ankle fracture can either be stable or unstable. This is determined after a physical exam and imaging studies like X-rays, a CT scan, or MRI.Stable fractures are treated with a cast, boot, or splint to hold the ankle still until the fracture heals. Unstable fractures require surgery to ensure that the bones heal properly.You will not be able to put weight on your injured side for several weeks.Pain medicines, icing, and raising (elevating) your injured ankle when sitting or lying down may help with pain relief. Follow instructions as told by your health care provider.This information is not intended to replace advice given to you by your health care provider. Make sure you discuss any questions you have with your health care provider.    RICE Therapy for Routine Care of Injuries    Many injuries can be cared for with rest, ice, compression, and elevation (RICE therapy). This includes:    Resting the injured part.  Putting ice on the injury.  Putting pressure (compression) on the injury.  Raising the injured part (elevation).  Using RICE therapy can help to lessen pain and swelling.      Supplies needed:    Ice.  Plastic bag.  Towel.  Elastic bandage.  Pillow or pillows to raise (elevate) your injured body part.  How to care for your injury with RICE therapy  Rest     Limit your normal activities, and try not to use the injured part of your body. You can go back to your normal activities when your doctor says it is okay to do them and you feel okay. Ask your doctor if you should do exercises to help your injury get better.      Ice     Put ice on the injured area. Do not put ice on your bare skin.  Put ice in a plastic bag.  Place a towel between your skin and the bag.  Leave the ice on for 20 minutes, 2–3 times a day. Use ice on as many days as told by your doctor.      Compression     Compression means putting pressure on the injured area. This can be done with an elastic bandage. If an elastic bandage has been put on your injury:    Do not wrap the bandage too tight. Wrap the bandage more loosely if part of your body away from the bandage is blue, swollen, cold, painful, or loses feeling (gets numb).  Take off the bandage and put it on again. Do this every 3–4 hours or as told by your doctor.  See your doctor if the bandage seems to make your problems worse.      Elevation     Elevation means keeping the injured area raised. If you can, raise the injured area above your heart or the center of your chest.      Contact a doctor if:    You keep having pain and swelling.  Your symptoms get worse.      Get help right away if:    You have sudden bad pain at your injury or lower than your injury.  You have redness or more swelling around your injury.  You have tingling or numbness at your injury or lower than your injury, and it does not go away when you take off the bandage.      Summary    Many injuries can be cared for using rest, ice, compression, and elevation (RICE therapy).  You can go back to your normal activities when you feel okay and your doctor says it is okay.  Put ice on the injured area as told by your doctor.  Get help if your symptoms get worse or if you keep having pain and swelling.  This information is not intended to replace advice given to you by your health care provider. Make sure you discuss any questions you have with your health care provider.    Our Emergency Department Referral Coordinators will be reaching out to you in the next 24-48 hours from 9:00am to 5:00pm with a follow up appointment. Please expect a phone call from the hospital in that time frame. If you do not receive a call or if you have any questions or concerns, you can reach them at [phone] or [phone].

## 2022-08-01 NOTE — ED PROVIDER NOTE - NS ED ROS FT
Constitutional: (-) fever, (-) chills, (-) lethargy  Eyes: (-) eye pain, (-) visual changes, (-) discharge  ENMT: (-) nasal congestion, (-) sore throat. (-) neck pain (-) neck stiffness  Respiratory: (-) cough, (-) SOB, (-) AMIN, (-) respiratory distress  Cardiac: (-) chest pain, (-) palpitations  GI: (-) abdominal pain, (-) nausea, (-) vomiting, (-) diarrhea.  :  (-) dysuria, (-) hematuria, (-) frequency   MS:  (-) back pain, (+) joint pain, L ankle  Neuro:  (-) headache, (-) numbness, (-) focal weakness, (-) tingling   Skin:  (-) rash  Except as documented in the HPI,  all other systems are negative

## 2022-08-01 NOTE — ED PROVIDER NOTE - OBJECTIVE STATEMENT
35 yo F with no PMH other than recent L ankle ORIF (July 13, [facility], unsure of surgeon) presenting for continued ankle pain and discomfort when exposed to sun. Patient denies any recent trauma, HT, LOC, AC, n/w/t, bleeding from the site, redness, warmth, antibiotic use. Patient has been taking percocet at home. Patient had repeat xrays done 2 days ago and says they were normal, but does not want to follow up with [facility].

## 2022-08-01 NOTE — ED PROVIDER NOTE - CARE PROVIDER_API CALL
Ezra Mayorga)  Orthopaedic Surgery  [address] [place] [address]  Phone: [phone]  Fax: [phone]  Follow Up Time: 1-3 Days

## 2022-08-01 NOTE — ED ADULT NURSE NOTE - NSFALLRSKUNASSIST_ED_ALL_ED
[facility]  [address], [place]  [id number] [phone]                                                                                         Fax [phone]                  [facility]                                       [address] [place]  [address] [phone]    Fax [phone]    [email or web address]      CONTACT LENS PRESCRIPTION AND MANAGEMENT AGREEMENT    The best contact lens care includes quality materials and professional services provided by experienced doctors.     At [facility], we are dedicated to providing the highest level of contact lens services to our patients. For this reason, we offer complete contact lens care which includes the professional fitting services and materials. This agreement outlines the policy at [facility] for our contact lens services.      1.  The type of lenses recommended for your eyes are: Toric (for astigmatism)     2.  The fitting charge includes the initial fitting appointment and followup visits during the adaptation period lasting up to three months.       Additional charges that may be incurred during the 3 month term of this fitting agreement:   A.  If it is determined during the adaptation period that your eyes require a more complex lens, an additional fee for the lenses and professional services will be due.    B.  If you lose or damage your contact lens(es), you will be expected to pay the full cost of a replacement lens.     3.  If contact lenses are ordered through [employer], full payment for the contact lenses will be due at time of order.     4.  REFUND POLICY:  Professional prescribing fees are non-refundable. In the event contact lenses are discontinued, the cost of the soft contact lens materials may be refunded if the boxes are unmarked, unopened, and in acceptable condition within 60 days from the order date. No refunds will be  given after that time period.     5.  After this prescription agreement expires (3 months from initial fitting of the contact lenses), you will be expected to pay for additional professional services at the time they are performed.      6.  FOLLOW-UP CARE:  Contact lens patients require special care and more frequent examinations because of the increased risk of eye infections and other complications which could lead to severe vision loss. Our office will notify you of the proper follow up required for your particular contact lens.    7.  In addition, annual comprehensive dilated eye examinations are necessary to ensure eye health. Our office requires yearly examinations be performed here to maintain a valid contact lens prescription. If an examination has not been completed within the previous 12 months, a contact lens prescription or supply will not be dispensed until a dilated examination is completed.     8.  IN THE EVENT OF AN EMERGENCY:  If you experience any of the following symptoms, you should immediately remove your contact lens and call our office at [phone], [phone], or [phone] after 5:00 p.m. and weekends.      · Eye pain or unusual irritation  · Cloudy or decreased vision  · Redness  · Sensitivity to light  · Tearing or discharge  · Any disturbance in vision or eye comfort    9.  RELEASE OF CONTACT LENS PRESCRIPTION:  Contact lenses are a medical device that can only be dispensed by a valid prescription. Contact lenses should be treated with the same caution as prescription medication. Eye health may be compromised with an improperly filled prescription and lack of follow-up care. We, therefore, highly recommend you receive your contacts only from [employer].      10.  CONTACT LENS FITTING ELSEWHERE:  Our office will not accept responsibility for contact lenses fit or received from any establishment other than those from [facility]. Any office visits for lenses fit or received  elsewhere will be subject to the usual office visit fee.     11.  INFORMATION FOR MONOVISION CONTACT LENS WEARERS:  Monovision contacts are fit so that one eye sees clearly at distance and one eye sees clearly up close. This system of contact lens fitting will not harm your eyes. You will meet state requirements for driving; however, you should be aware driving with only one eye seeing clearly at distance may impair your ability to  distances. Your vision for driving will not be as clear as it would be if both eyes were used together. You should not drive until you have adjusted to your monovision contacts. We suggest you wear your glasses, distance contact lenses in each eye, or glasses designed to be worn over contact lenses to improve distance vision while driving.      12.  No verbal agreement or arrangements will supersede this written agreement. Any modifications to this agreement must be made in writing by a doctor or his or her designees.     CONTACT LENS CARE AND HANDLING    Proper contact lens care is necessary to minimize the risk of eye injury and vision loss and to maintain comfort.      CONTACT LENS SOLUTIONS TO BE USED FOR CLEANING AND DISINFECTION:  Biotrue    This product has been prescribed specifically for your lenses and eyes. Do not change or substitute brand unless you check with our office first. Use of improper solutions may result in lens damage or eye irritation.      GENERAL LENS CARE:    Basic Instructions:  · Always wash and rinse your hands before handling your contact lenses. Dry your hands thoroughly with a lint free towel.    · Your lenses must be BOTH cleaned and disinfected every time you remove them. Cleaning is necessary to remove build-up and film from the lens surface. The combination of CLEANING AND DISINFECTING has been shown to prevent the growth of microorganisms.   · Clean one lens first (always the same lens first to avoid mix-ups) and rinse the lens thoroughly.  Follow the directions provided with the recommended cleaning solution.    · Place the lens in the correct chamber of the lens storage case. Repeat the procedure for the second lens.   · Disinfect your lenses using the system recommended.   · To store your lenses, disinfect and leave them in the closed/unopened storage case until ready to wear.   · If the lenses have been stored in the unopened storage case for more than 7 days, re-disinfect them before wearing by discarding the old solution and filling the lens chambers with fresh solution.   · To prevent contamination and to help avoid serious eye injury, always empty and rinse your contact lens case after each use with fresh lens storage solution and allow to air dry. Do not reuse the solution left in your case; fresh storage solution mostly every night and. Never clean your case with water, soaps, detergents, or other cleaning agents. If the case cannot be properly cleaned, it should be replaced. Cases should be replaced every 3 months no matter what.      To Rewet a Dried Lens:    · Handle the lens carefully.   · Place then lens in its storage case and soak the lens in the recommended cleaning and disinfecting solution for at least 6 hours.   · Clean and disinfect the rewetted (rehydrated) lens using the lens care system recommended by your eye care practitioner.   · If after soaking, the lens does not become soft, DO NOT USE THE LENS, and consult our office.     Care For a Sticking Lens:  If the lens sticks (stops moving) on the eye, apply 2-3 drops of a recommended lubricating solution. If the lens continues to stick, consult your eye care practitioner.      PRECAUTIONS:      · Always follow recommended lens care systems for your contact lens. Use lens care solutions recommended by our office and carefully follow the recommended directions.    · Do not use hard contact lens solutions which are not indicated for soft contact lenses.   · Always use FRESH  rinsing, disinfecting, and storing solutions daily.    · Do not mix or alternate thermal (heat) and chemical (not heat) lens care systems.   · Never use a chemical disinfecting solution with heat unless recommended in the product labeling.    · Do not use saliva or anything other than the recommended solutions to wet your lenses.   · Always keep the lenses completely immersed in the recommended storage solution when the lenses are not being worn. Prolonged periods of drying will dehydrate your lenses.   · Do not use any water with soft contact lenses.   · The lens must move freely on the eye for the continued health of the eye. If your lens sticks or does not move on the eye consult our office.   · Always wash and rinse your hands before you handle your lenses. Eye irritation may result if cosmetics, lotions, soaps, creams, or deodorants come in contact with your lenses and if the lenses are contaminated by infectious or non-infectious dirt.   · Avoid using aerosol products such as hair spray while wearing your lenses. If sprays are used, keep your eyes closed until the spray has settled.   · DO NOT WEAR CONTACT LENSES WHILE SLEEPING.   · Avoid all harmful or irritating vapors and fumes while wearing your lenses.  · Do not swim with your lenses in place.   · Never use tweezers or other tools to remove your lens from the storage case. Pour the lens into your hand to prevent ripping.    · Do not touch the lens with your fingernails.   · Always consult our office before using any eyedrops in your eyes.   · Always inform your primary care physician that you wear contact lenses.   · Always inform your employer that you wear contact lenses. Some jobs require the use of eye protection or require that you not wear contact lenses.      WEARING SCHEDULE:     DAY  HOURS  1.  4-6  2.  6-8  3.  8-10  4.  10-12  5.  12-14  6.   14-16  7.   16-18    Please have your contact lenses in your eyes for at least 4 hours before your next  appointment. If you cannot keep this appointment, please call to reschedule.        IN THE BEGINNING IT IS NORMAL IF:     1.  Your lenses itch or feel unusual.    2.  One lens is more noticeable than the other.    3.  Your vision seems less clear than with glasses.     4.  One eye sees better than the other.     5.  You have trouble applying or removing your lenses.      REMOVE YOUR LENSES IF:      1.  You develop eye pain or irritation.     2.  You develop unusually cloudy or decreased vision.     3.  You experience a decrease in vision that does not clear up.     4.  You become sensitive to light.    5.  You experience watering or discharge from the eyes.     6.  You experience redness of the eyes.    7.  You suspect something is wrong.      · If the discomfort or problem stops once you remove the lens, then look closely at the lens.   · If the lens is in any way damaged. DO NOT put the lens back on the eye. Place the lens in the storage case and contact our office.    · If the lens has dirt, an eyelash, or other foreign body on it, or the problem stops and lens appears undamaged, thoroughly clean, rinse and disinfect the lens, then re-insert.    · If the problem continues, IMMEDIATELY remove the lens and contact our office.       If any of the above symptoms occur, a serious condition such as infection, corneal ulcer, corneal edema, giant papillary conjunctivitis, or iritis may be the cause. Immediately remove your lenses and seek prompt treatment to avoid serious eye injury and vision loss. Call [facility] at [phone], [phone], or [phone] after 5:00 p.m. and weekends.        CONTACT LENS COMPATIBLE MAKE-UP    MASCARA   Brand Names   Almay One Coat/One Coat Waterproof; Longest Lashes Mascara    Super Rich Mascara    Conditioning Plus; Stay Smooth Mascara    Amazing Lash/Amazing Lash Waterproof       Kamryn García/State-of-the-Art Mascara       Clinique Naturally  Glossy Mascara; Supermascara;    Gentle Waterproof Mascara;     Full Potential Long Stemmed lashes       Cover Girl Extremely Gentle       Cynacon Ocusoft Mascara       Lancome Tendrecils       L'Oreal Color Endour; Lash Out/Waterproof/Lash Out    Feather Lash; Le Grand Curl;    Waterproof Voluminous Mascara       Emilie Illegal Lengths Mascara;    Lash by Lash Washable & Waterproof;    Volum' Express; Wonder Curl; Full N' Soft;    Great Lash; Dial-A-Lash; Ultra Big Ultra Lash       Max Factor 2000 Calorie; Lash Enhance; G-G-T-E-T-C-H       Physician's Formula Plentiful Thickening Mascara;    Aqua-Wear Cond. Waterproof Mascara    LipLash Spalsh; Month 2 Month;    Brightening & Curling       Remmel Endless Lash; Exaggerate       Revlon Colorstay Lashcolor; Everylash Curling Mascara     EYE CREAMS   Brand Names   Almay A.M./P.M. Intensive Eye Treatment;    Moisture Creme; Stress Eye Gelc       [facility] Fluido Protettino       Clinique Daily Eye Benefits; Daily Eye Saver       Igenics Igiene       Lancome Expressibr Eyecream       Physicians Formula Luxury Eye Cream; Oil Free Nourishing Eye Gel     SKIN CARE   Brand Names   Clinique Skin Care System       Cynacon OcuSoft Facial/Skin Cleanser       Physicians Formula Gentle Cleansing Lotion; Deep Pore Cleansing Gel       [employer] Vit-A-Clenz - Liquid Cleanser;    Vit-A-Clenz - Lotion Cleanser;    Vit-A-Spritzx - Skin Enhancer    Vit-A-Silk - Lotion Moisturizer     CONCEALERS    Brand Names   Clinique Advanced Concealer;    City Cover Compact Concealer SPF 15;    Soft Conceal Corrector; Quick Corrector       L'Oreal Visible Lift Eye Line Minimizing Concealer     EYE PENCILS/EYELINERS   Brand Names   Almay Wet Proof Shadowliner; Eye-Defining Liquid Liner;    Easy Eyes Self Sharpening Eye Pencil; I-Liner Liquid Eyeliner    Amazing Lasting Eye Pencil; One Coat Gel Eye Pencil       Connortheron Eye Accents Pencil        Cassandra Double Effect Eye Pencil       Clinique Quick Eyes; Eye-Shading Pencil; Water-Resistant Eye Liner    Touch Liner; Shadowliner       L'Oreal Super Liner; Lineur Intense       Emilie Expert Eyes Brow and  & Liner Pencil; Lineworks Ultra Liner Liquid; Eye Express       Physicians Formula Eye definer Automatic Eye Pencil: Gentle Wear Eye Pencil; Fineline; Eyebrightener Duo Pencil; Eyebrightener Pencil; Eyeliner; Eyeliner Palette Multi-Colored Cake Liner       Revlon Eye opener Duo-Tone Line     EYE SHADOWS   Brand Names   Almay Amazing Lasting Eyecolor; Liquid-to-Powder Eye Tint; Easy-to-Wear Longlasting EyeColor       Borghese Shadow Murdock (Single, Duo, Trio); Shadow Tianna Crema       Clinque Soft-Pressed Eye Shadow; Touch Base for Eyes; Smudgesicle       L'Oreal Soft Effects       Emilie Revitalizing Eye Shadow; Cool Effect Cream Eyecolor       Physicians Formula Matte Collection Eye Shadow       Revlon Wet/Dry Shadow     EYE MAKE-UP REMOVERS   Brand Names   Allergen Lids & Lashes (cleaning pads)       Almay Non-Oily Eye makeup Remover; Moisturizing Eye Makeup Remover       Borghese Gel Delicato       CIBAVision EYEscrub       Clarins Emulsion Douce Demaquillante; Lotion Douce Demaquillante       Clinique Extremely Gentle Eye Makeup Remover; Rinse-off Eye Makeup Solvent; Take The day Off       Cynacon OcuSoft Eye Make-Up Remover       [employer]uder Gentle Eye Makeup Remover; Lip and Eye Makeup Remover For Long Wear Formula       Lancome Efacil; Tendrecils       L'Oreal Plentitude Refreshing Eye Make-Up Remover       Physicians Formula Eye Makeup Remover Lotion; Eye Makeup Remover Pads; Oil Free Makeup Remover Pads        no

## 2022-08-01 NOTE — ED PROVIDER NOTE - PATIENT PORTAL LINK FT
You can access the FollowMyHealth Patient Portal offered by [facility] by registering at the following website: [email or web address]. By joining [facility]’s FollowMyHealth portal, you will also be able to view your health information using other applications (apps) compatible with our system.

## 2022-08-01 NOTE — ED PROVIDER NOTE - CLINICAL SUMMARY MEDICAL DECISION MAKING FREE TEXT BOX
36 year old female presented with bilateral upper extremity shoulder pain and left ankle pain after recent surgery. Patient's pain appears to be chronic in nature, no acute changes, and wanted to establish follow up. Patient was given medications for pain relief and instructed to have close follow up with ortho/PMD. Patient was re-splinted on left ankle. No signs of infection, compartment syndrome, pain out of proportion or change in swelling. Patient's pain has not increased in intensity since the surgery. Patient's primary concern was b/l UE shoulder pain that is described in burning in nature without signs of cellulitis, swelling or significant TTP.     I personally evaluated the patient. I reviewed the Resident’s or Physician Assistant’s note (as assigned above), and agree with the findings and plan except as documented in my note.    CONSTITUTIONAL: Well-developed; well-nourished; in no acute distress.   SKIN: warm, dry  HEAD: Normocephalic; atraumatic.  EYES: PERRL, EOMI, normal sclera and conjunctiva   ENT: No nasal discharge; airway clear.  NECK: Supple; non tender.  CARD: S1, S2 normal; no murmurs, gallops, or rubs. Regular rate and rhythm.   RESP: No wheezes, rales or rhonchi.  ABD: soft ntnd  EXT: + left ankle mild swelling with ttp around suture site, no signs of purulence, or drainage. b/l UE shoulder mild ttp   LYMPH: No acute cervical adenopathy.  NEURO: Alert, oriented, grossly unremarkable  PSYCH: Cooperative, appropriate.

## 2022-08-01 NOTE — ED ADULT TRIAGE NOTE - CHIEF COMPLAINT QUOTE
I got surgery on my ankle on the 13th of this month, I broke my ankle. Ever since the surgery every time the sun hits my skin on my shoulders or cold air it hurts. It wasn't like that before the surgery - patient

## 2022-08-08 PROBLEM — Z00.00 ENCOUNTER FOR PREVENTIVE HEALTH EXAMINATION: Status: ACTIVE | Noted: 2022-08-08

## 2022-08-09 ENCOUNTER — APPOINTMENT (OUTPATIENT)
Dept: PAIN MANAGEMENT | Facility: CLINIC | Age: 37
End: 2022-08-09

## 2022-08-09 VITALS — WEIGHT: 180 LBS | HEIGHT: 67 IN | BODY MASS INDEX: 28.25 KG/M2

## 2022-08-09 DIAGNOSIS — S82.892A OTHER FRACTURE OF LEFT LOWER LEG, INITIAL ENCOUNTER FOR CLOSED FRACTURE: ICD-10-CM

## 2022-08-09 LAB — AMPHET UR-MCNC: NEGATIVE

## 2022-08-09 PROCEDURE — 80305 DRUG TEST PRSMV DIR OPT OBS: CPT | Mod: QW

## 2022-08-09 PROCEDURE — 99204 OFFICE O/P NEW MOD 45 MIN: CPT

## 2022-08-09 NOTE — PHYSICAL EXAM
[de-identified] : GENERAL APPEARANCE OF PATIENT IS WELL DEVELOPED, WELL NOURISHED, BODY HABITUS NORMAL, WELL GROOMED, NO DEFORMITIES NOTED. \par Head - Atraumatic and Normocephalic \par Eyes, Nose, and Throat: External inspection of ears and nose are normal overall without scars, lesions, or masses noted. Assessment of hearing is normal\par Neck-Examination of neck shows no masses, overall appearance is normal, neck is symmetric, tracheal position is midline, no crepitus is noted. Examination of thyroid shows no enlargement, tenderness or masses\par Respiratory- Assessment of respiratory effort shows no intercostal retractions, no use of accessory muscles, unlabored breathing, and normal diaphragmatic movement.\par Cardiovascular- Examination of extremities show no edema or varicosities\par Musculoskeletal. Examination of gait is not antalgic and station is normal\par Inspection and palpation of digits and nails shows no clubbing, cyanosis, nodules, drainage, fluctuance, petechiae\par \par • Spine – inspection and palpation shows no misalignment, asymmetry, crepitation, defects, tenderness, masses, effusions. ROM is normal without crepitation or contracture. No instability or subluxation or laxity is noted. No abnormal movements.\par \par \par • Neck- inspection and palpation shows no misalignment, asymmetry, crepitation, defects, tenderness, masses, effusions. ROM is normal without crepitation or contracture. No instability or subluxation or laxity is noted. No abnormal movements.\par \par \par • RUE- inspection and palpation shows no misalignment, asymmetry, crepitation, defects, tenderness, masses, effusions. ROM is normal without crepitation or contracture. No instability or subluxation or laxity is noted. No abnormal movements.\par \par \par • LUE- inspection and palpation shows no misalignment, asymmetry, crepitation, defects, tenderness, masses, effusions. ROM is normal without crepitation or contracture. No instability or subluxation or laxity is noted. No abnormal movements.\par \par \par • RLE- inspection and palpation shows no misalignment, asymmetry, crepitation, defects, tenderness, masses, effusions. ROM is normal without crepitation or contracture. No instability or subluxation or laxity is noted. No abnormal movements.\par \par \par • LLE- allodynia and hyperalgesia 2/3 down the left leg. Multiple incisions on the left foot and ankle. Incisions are healing well no signs of infection. Swelling on the left foot. \par \par \par • Skin- Inspection of skin and subcutaneous tissue shows no rashes, lesions or ulcers. Palpation of skin and subcutaneous tissue shows no rashes, no indurations, subcutaneous nodules or tightening.\par \par \par • Abdomen- Nontender\par \par \par • Neurologic- CN 2-12 are grossly intact. No sensory or motor deficits in the upper and lower extremities. Adequate strength in upper and lower extremities \par \par \par • Psychiatric- Patient’s judgment and insight are intact. Oriented to time, place and person. Recent and remote memory intact.\par
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
## 2022-08-09 NOTE — REASON FOR VISIT
[Initial Consultation] : an initial pain management consultation [FreeTextEntry2] : PAIN AFTER SURGERY. LEFT LEG

## 2022-08-23 ENCOUNTER — APPOINTMENT (OUTPATIENT)
Dept: PAIN MANAGEMENT | Facility: CLINIC | Age: 37
End: 2022-08-23

## 2022-09-12 ENCOUNTER — APPOINTMENT (OUTPATIENT)
Dept: PAIN MANAGEMENT | Facility: CLINIC | Age: 37
End: 2022-09-12

## 2022-09-27 ENCOUNTER — APPOINTMENT (OUTPATIENT)
Dept: PAIN MANAGEMENT | Facility: CLINIC | Age: 37
End: 2022-09-27

## 2022-09-27 VITALS
HEIGHT: 67 IN | HEART RATE: 82 BPM | DIASTOLIC BLOOD PRESSURE: 80 MMHG | SYSTOLIC BLOOD PRESSURE: 130 MMHG | WEIGHT: 180 LBS | BODY MASS INDEX: 28.25 KG/M2

## 2022-09-27 PROCEDURE — 99214 OFFICE O/P EST MOD 30 MIN: CPT

## 2022-09-27 NOTE — PHYSICAL EXAM
[de-identified] : GENERAL APPEARANCE OF PATIENT IS WELL DEVELOPED, WELL NOURISHED, BODY HABITUS NORMAL, WELL GROOMED, NO DEFORMITIES NOTED. \par Head - Atraumatic and Normocephalic \par Eyes, Nose, and Throat: External inspection of ears and nose are normal overall without scars, lesions, or masses noted. Assessment of hearing is normal\par Neck-Examination of neck shows no masses, overall appearance is normal, neck is symmetric, tracheal position is midline, no crepitus is noted. Examination of thyroid shows no enlargement, tenderness or masses\par Respiratory- Assessment of respiratory effort shows no intercostal retractions, no use of accessory muscles, unlabored breathing, and normal diaphragmatic movement.\par Cardiovascular- Examination of extremities show no edema or varicosities\par Musculoskeletal. Examination of gait is not antalgic and station is normal\par Inspection and palpation of digits and nails shows no clubbing, cyanosis, nodules, drainage, fluctuance, petechiae\par \par • Spine – inspection and palpation shows no misalignment, asymmetry, crepitation, defects, tenderness, masses, effusions. ROM is normal without crepitation or contracture. No instability or subluxation or laxity is noted. No abnormal movements.\par \par \par • Neck- inspection and palpation shows no misalignment, asymmetry, crepitation, defects, tenderness, masses, effusions. ROM is normal without crepitation or contracture. No instability or subluxation or laxity is noted. No abnormal movements.\par \par \par • RUE- inspection and palpation shows no misalignment, asymmetry, crepitation, defects, tenderness, masses, effusions. ROM is normal without crepitation or contracture. No instability or subluxation or laxity is noted. No abnormal movements.\par \par \par • LUE- inspection and palpation shows no misalignment, asymmetry, crepitation, defects, tenderness, masses, effusions. ROM is normal without crepitation or contracture. No instability or subluxation or laxity is noted. No abnormal movements.\par \par \par • RLE- inspection and palpation shows no misalignment, asymmetry, crepitation, defects, tenderness, masses, effusions. ROM is normal without crepitation or contracture. No instability or subluxation or laxity is noted. No abnormal movements.\par \par \par • LLE- allodynia and hyperalgesia 2/3 down the left leg. Multiple incisions on the left foot and ankle. Incisions are healing well no signs of infection. Swelling on the left foot. \par \par \par • Skin- Inspection of skin and subcutaneous tissue shows no rashes, lesions or ulcers. Palpation of skin and subcutaneous tissue shows no rashes, no indurations, subcutaneous nodules or tightening.\par \par \par • Abdomen- Nontender\par \par \par • Neurologic- CN 2-12 are grossly intact. No sensory or motor deficits in the upper and lower extremities. Adequate strength in upper and lower extremities \par \par \par • Psychiatric- Patient’s judgment and insight are intact. Oriented to time, place and person. Recent and remote memory intact.\par
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
## 2022-09-27 NOTE — REASON FOR VISIT
[Follow-Up Visit] : a follow-up pain management visit [FreeTextEntry2] : medication refill/ trailing pt

## 2022-10-24 ENCOUNTER — APPOINTMENT (OUTPATIENT)
Dept: PAIN MANAGEMENT | Facility: CLINIC | Age: 37
End: 2022-10-24

## 2022-10-24 VITALS
HEART RATE: 81 BPM | DIASTOLIC BLOOD PRESSURE: 96 MMHG | WEIGHT: 180 LBS | SYSTOLIC BLOOD PRESSURE: 145 MMHG | HEIGHT: 67 IN | BODY MASS INDEX: 28.25 KG/M2

## 2022-10-24 PROCEDURE — 99214 OFFICE O/P EST MOD 30 MIN: CPT
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
## 2022-10-24 NOTE — PHYSICAL EXAM
[de-identified] : GENERAL APPEARANCE OF PATIENT IS WELL DEVELOPED, WELL NOURISHED, BODY HABITUS NORMAL, WELL GROOMED, NO DEFORMITIES NOTED. \par Head - Atraumatic and Normocephalic \par Eyes, Nose, and Throat: External inspection of ears and nose are normal overall without scars, lesions, or masses noted. Assessment of hearing is normal\par Neck-Examination of neck shows no masses, overall appearance is normal, neck is symmetric, tracheal position is midline, no crepitus is noted. Examination of thyroid shows no enlargement, tenderness or masses\par Respiratory- Assessment of respiratory effort shows no intercostal retractions, no use of accessory muscles, unlabored breathing, and normal diaphragmatic movement.\par Cardiovascular- Examination of extremities show no edema or varicosities\par Musculoskeletal. Examination of gait is not antalgic and station is normal\par Inspection and palpation of digits and nails shows no clubbing, cyanosis, nodules, drainage, fluctuance, petechiae\par \par • Spine – inspection and palpation shows no misalignment, asymmetry, crepitation, defects, tenderness, masses, effusions. ROM is normal without crepitation or contracture. No instability or subluxation or laxity is noted. No abnormal movements.\par \par \par • Neck- inspection and palpation shows no misalignment, asymmetry, crepitation, defects, tenderness, masses, effusions. ROM is normal without crepitation or contracture. No instability or subluxation or laxity is noted. No abnormal movements.\par \par \par • RUE- inspection and palpation shows no misalignment, asymmetry, crepitation, defects, tenderness, masses, effusions. ROM is normal without crepitation or contracture. No instability or subluxation or laxity is noted. No abnormal movements.\par \par \par • LUE- inspection and palpation shows no misalignment, asymmetry, crepitation, defects, tenderness, masses, effusions. ROM is normal without crepitation or contracture. No instability or subluxation or laxity is noted. No abnormal movements.\par \par \par • RLE- inspection and palpation shows no misalignment, asymmetry, crepitation, defects, tenderness, masses, effusions. ROM is normal without crepitation or contracture. No instability or subluxation or laxity is noted. No abnormal movements.\par \par \par • LLE- allodynia and hyperalgesia 2/3 down the left leg. Multiple incisions on the left foot and ankle. Incisions are healing well no signs of infection. Swelling on the left foot. \par \par \par • Skin- Inspection of skin and subcutaneous tissue shows no rashes, lesions or ulcers. Palpation of skin and subcutaneous tissue shows no rashes, no indurations, subcutaneous nodules or tightening.\par \par \par • Abdomen- Nontender\par \par \par • Neurologic- CN 2-12 are grossly intact. No sensory or motor deficits in the upper and lower extremities. Adequate strength in upper and lower extremities \par \par \par • Psychiatric- Patient’s judgment and insight are intact. Oriented to time, place and person. Recent and remote memory intact.\par

## 2022-10-24 NOTE — DISCUSSION/SUMMARY
[de-identified] : Ms. Perez is a 36-year-old female with a chief complaint of left leg pain.  She has had this pain for about 2 months following an injury in which she fractured her ankle in 2 places.  Patient states that pain is made worse by a left ankle surgery which took place on 07/13/2022. She notes that the use of medication provides her with at least 80% relief throughout the day. At this time we will begin weaning the patient off of her narcotic medication. She will decrease her dosage of medication to Percocet 5-325mg BID as well as nortriptyline, one tablet at night. Overall there is at least a 30-50% reduction in pain with the prescribed analgesics. The patient denies any adverse side effects due to the medication (sleeping disturbance, constipation, sleepiness, hallucinations and/or urination problems). UDS done at last encounter on 8/9/22 was consistent and up to date.\par \par At this time, she is scheduled to consult with Dr. Cancino for a 2nd opinion in regard to her left leg and ankle pain.  All of this patient's questions were answered and she understood our conversation well. \par \par I, Makenzie Parisi, attest that this documentation has been prepared under the direction and in the presence of Provider Epi Dangelo DO\par The documentation recorded by the scribe, in my presence, accurately reflects the service I personally performed, and the decisions made by me with my edits as appropriate.\par \par Best Regards, \par Epi Dangelo D.O. \par Diplomat, American Board of Anesthesiology \par Diplomat, American Board of Pain Medicine \par Diplomat, American Board of Pain Management

## 2022-11-11 ENCOUNTER — APPOINTMENT (OUTPATIENT)
Dept: ORTHOPEDIC SURGERY | Facility: CLINIC | Age: 37
End: 2022-11-11

## 2022-11-14 ENCOUNTER — APPOINTMENT (OUTPATIENT)
Dept: PAIN MANAGEMENT | Facility: CLINIC | Age: 37
End: 2022-11-14

## 2022-11-21 ENCOUNTER — APPOINTMENT (OUTPATIENT)
Dept: ORTHOPEDIC SURGERY | Facility: CLINIC | Age: 37
End: 2022-11-21

## 2022-11-21 ENCOUNTER — APPOINTMENT (OUTPATIENT)
Dept: PAIN MANAGEMENT | Facility: CLINIC | Age: 37
End: 2022-11-21

## 2022-11-21 VITALS
BODY MASS INDEX: 28.25 KG/M2 | WEIGHT: 180 LBS | SYSTOLIC BLOOD PRESSURE: 146 MMHG | DIASTOLIC BLOOD PRESSURE: 97 MMHG | HEART RATE: 78 BPM | HEIGHT: 67 IN

## 2022-11-21 DIAGNOSIS — G89.29 OTHER CHRONIC PAIN: ICD-10-CM

## 2022-11-21 PROCEDURE — 99406 BEHAV CHNG SMOKING 3-10 MIN: CPT

## 2022-11-21 PROCEDURE — 99214 OFFICE O/P EST MOD 30 MIN: CPT
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
## 2022-11-21 NOTE — DISCUSSION/SUMMARY
[de-identified] : Ms. Perez is a 36-year-old female with a chief complaint of left leg pain.  She has had this pain for about 2 months following an injury in which she fractured her ankle in 2 places. Patient states that pain is made worse by a left ankle surgery which took place on 07/13/2022. She notes that the use of medication provides her with at least 80% relief throughout the day. At this time we will begin weaning the patient off of her narcotic medication. She will decrease her dosage of medication to Percocet 5-325mg BID as well as nortriptyline, one tablet at night. Overall there is at least a 30-50% reduction in pain with the prescribed analgesics. The patient denies any adverse side effects due to the medication (sleeping disturbance, constipation, sleepiness, hallucinations and/or urination problems). UDS done at last encounter on 8/9/22 was consistent and up to date.\par \par Patient continues to have severe pain in the left ankle and foot. She has physical findings of hyperalgesia and allodynia. Patient has history and physical findings that support a sympathetic pain syndrome in the left lower extremity. Patient has physical findings of allodynia, hyperalgesia decreased range of motion and pseudo-motor changes. Schedule a lumbar sympathetic plexus on the left side. Risk, benefits, pros and cons of procedure were explained to the patient using models and diagrams and their questions were answered.  \par \par The patient has severe anxiety of procedures that necessitates monitored anesthesia care (MAC). The procedure performed will be close to major nerves, arteries, and spinal cord and/or joint structures. Due to the proximity of these structures, we need the patient to be still during the procedure.  With the help of MAC, this will be safely achieved and decrease the risk of any complications.\par \par At this time, she is scheduled to consult with Dr. Cancino for a 2nd opinion in regard to her left leg and ankle pain.  All of this patient's questions were answered and she understood our conversation well. \par \par We have spent approximately 5-10 minutes discussing the importance of smoking cessation and suggested a follow up with a primary care doctor to see if the doctor can aid in those endeavors. \par \par I, Makenzie Parisi, attest that this documentation has been prepared under the direction and in the presence of Provider Epi Dangelo DO\par The documentation recorded by the scribe, in my presence, accurately reflects the service I personally performed, and the decisions made by me with my edits as appropriate.\par \par Best Regards, \par Epi Dangelo D.O. \par Diplomat, American Board of Anesthesiology \par Diplomat, American Board of Pain Medicine \par Diplomat, American Board of Pain Management

## 2022-11-21 NOTE — PHYSICAL EXAM
[de-identified] : GENERAL APPEARANCE OF PATIENT IS WELL DEVELOPED, WELL NOURISHED, BODY HABITUS NORMAL, WELL GROOMED, NO DEFORMITIES NOTED. \par Head - Atraumatic and Normocephalic \par Eyes, Nose, and Throat: External inspection of ears and nose are normal overall without scars, lesions, or masses noted. Assessment of hearing is normal\par Neck-Examination of neck shows no masses, overall appearance is normal, neck is symmetric, tracheal position is midline, no crepitus is noted. Examination of thyroid shows no enlargement, tenderness or masses\par Respiratory- Assessment of respiratory effort shows no intercostal retractions, no use of accessory muscles, unlabored breathing, and normal diaphragmatic movement.\par Cardiovascular- Examination of extremities show no edema or varicosities\par Musculoskeletal. Examination of gait is not antalgic and station is normal\par Inspection and palpation of digits and nails shows no clubbing, cyanosis, nodules, drainage, fluctuance, petechiae\par \par • Spine – inspection and palpation shows no misalignment, asymmetry, crepitation, defects, tenderness, masses, effusions. ROM is normal without crepitation or contracture. No instability or subluxation or laxity is noted. No abnormal movements.\par \par \par • Neck- inspection and palpation shows no misalignment, asymmetry, crepitation, defects, tenderness, masses, effusions. ROM is normal without crepitation or contracture. No instability or subluxation or laxity is noted. No abnormal movements.\par \par \par • RUE- inspection and palpation shows no misalignment, asymmetry, crepitation, defects, tenderness, masses, effusions. ROM is normal without crepitation or contracture. No instability or subluxation or laxity is noted. No abnormal movements.\par \par \par • LUE- inspection and palpation shows no misalignment, asymmetry, crepitation, defects, tenderness, masses, effusions. ROM is normal without crepitation or contracture. No instability or subluxation or laxity is noted. No abnormal movements.\par \par \par • RLE- inspection and palpation shows no misalignment, asymmetry, crepitation, defects, tenderness, masses, effusions. ROM is normal without crepitation or contracture. No instability or subluxation or laxity is noted. No abnormal movements.\par \par \par • LLE- allodynia and hyperalgesia 2/3 down the left leg. Multiple incisions on the left foot and ankle. Incisions are healing well no signs of infection. Swelling on the left foot. \par \par \par • Skin- Inspection of skin and subcutaneous tissue shows no rashes, lesions or ulcers. Palpation of skin and subcutaneous tissue shows no rashes, no indurations, subcutaneous nodules or tightening.\par \par \par • Abdomen- Nontender\par \par \par • Neurologic- CN 2-12 are grossly intact. No sensory or motor deficits in the upper and lower extremities. Adequate strength in upper and lower extremities \par \par \par • Psychiatric- Patient’s judgment and insight are intact. Oriented to time, place and person. Recent and remote memory intact.\par

## 2022-12-09 NOTE — DISCUSSION/SUMMARY
[de-identified] : Ms. Perez is a 36-year-old female with a chief complaint of left leg pain.  She has had this pain for about 2 months following an injury in which she fractured her ankle in 2 places.  Patient states that pain is made worse by a left ankle surgery which took place on 07/13/2022.  Patient will continue to manage her pain medically with Percocet 10-325mg BID as well as nortriptyline, one tablet at night. Overall there is at least a 30-50% reduction in pain with the prescribed analgesics. The patient denies any adverse side effects due to the medication (sleeping disturbance, constipation, sleepiness, hallucinations and/or urination problems). UDS done at last encounter on 8/9/22 was consistent and up to date.\par \par At this time, we will refer her to Dr. Cancino for a 2nd opinion in regard to her left leg and ankle pain.  All of this patient's questions were answered and she understood our conversation well. \par \par I, Makenzie Parisi, attest that this documentation has been prepared under the direction and in the presence of Provider Epi Dangelo DO\par The documentation recorded by the scribe, in my presence, accurately reflects the service I personally performed, and the decisions made by me with my edits as appropriate.\par \par Best Regards, \par Epi Dangelo D.O. \par Diplomat, American Board of Anesthesiology \par Diplomat, American Board of Pain Medicine \par Diplomat, American Board of Pain Management 
none

## 2022-12-15 ENCOUNTER — APPOINTMENT (OUTPATIENT)
Dept: ORTHOPEDIC SURGERY | Facility: CLINIC | Age: 37
End: 2022-12-15

## 2022-12-19 ENCOUNTER — APPOINTMENT (OUTPATIENT)
Dept: PAIN MANAGEMENT | Facility: CLINIC | Age: 37
End: 2022-12-19

## 2023-01-03 ENCOUNTER — LABORATORY RESULT (OUTPATIENT)
Age: 38
End: 2023-01-03

## 2023-01-03 ENCOUNTER — APPOINTMENT (OUTPATIENT)
Dept: PAIN MANAGEMENT | Facility: CLINIC | Age: 38
End: 2023-01-03
Payer: MEDICAID

## 2023-01-03 VITALS
DIASTOLIC BLOOD PRESSURE: 93 MMHG | SYSTOLIC BLOOD PRESSURE: 152 MMHG | WEIGHT: 180 LBS | HEIGHT: 67 IN | BODY MASS INDEX: 28.25 KG/M2 | HEART RATE: 82 BPM

## 2023-01-03 DIAGNOSIS — F17.200 NICOTINE DEPENDENCE, UNSPECIFIED, UNCOMPLICATED: ICD-10-CM

## 2023-01-03 LAB — PM MDA: NORMAL

## 2023-01-03 PROCEDURE — 99214 OFFICE O/P EST MOD 30 MIN: CPT

## 2023-01-03 PROCEDURE — 99406 BEHAV CHNG SMOKING 3-10 MIN: CPT

## 2023-01-03 NOTE — PHYSICAL EXAM
[de-identified] : GENERAL APPEARANCE OF PATIENT IS WELL DEVELOPED, WELL NOURISHED, BODY HABITUS NORMAL, WELL GROOMED, NO DEFORMITIES NOTED. \par Head - Atraumatic and Normocephalic \par Eyes, Nose, and Throat: External inspection of ears and nose are normal overall without scars, lesions, or masses noted. Assessment of hearing is normal\par Neck-Examination of neck shows no masses, overall appearance is normal, neck is symmetric, tracheal position is midline, no crepitus is noted. Examination of thyroid shows no enlargement, tenderness or masses\par Respiratory- Assessment of respiratory effort shows no intercostal retractions, no use of accessory muscles, unlabored breathing, and normal diaphragmatic movement.\par Cardiovascular- Examination of extremities show no edema or varicosities\par Musculoskeletal. Examination of gait is not antalgic and station is normal\par Inspection and palpation of digits and nails shows no clubbing, cyanosis, nodules, drainage, fluctuance, petechiae\par \par • Spine – inspection and palpation shows no misalignment, asymmetry, crepitation, defects, tenderness, masses, effusions. ROM is normal without crepitation or contracture. No instability or subluxation or laxity is noted. No abnormal movements.\par \par \par • Neck- inspection and palpation shows no misalignment, asymmetry, crepitation, defects, tenderness, masses, effusions. ROM is normal without crepitation or contracture. No instability or subluxation or laxity is noted. No abnormal movements.\par \par \par • RUE- inspection and palpation shows no misalignment, asymmetry, crepitation, defects, tenderness, masses, effusions. ROM is normal without crepitation or contracture. No instability or subluxation or laxity is noted. No abnormal movements.\par \par \par • LUE- inspection and palpation shows no misalignment, asymmetry, crepitation, defects, tenderness, masses, effusions. ROM is normal without crepitation or contracture. No instability or subluxation or laxity is noted. No abnormal movements.\par \par \par • RLE- inspection and palpation shows no misalignment, asymmetry, crepitation, defects, tenderness, masses, effusions. ROM is normal without crepitation or contracture. No instability or subluxation or laxity is noted. No abnormal movements.\par \par \par • LLE- allodynia and hyperalgesia 2/3 down the left leg. Multiple incisions on the left foot and ankle. Incisions are healing well no signs of infection. Swelling on the left foot. \par \par \par • Skin- Inspection of skin and subcutaneous tissue shows no rashes, lesions or ulcers. Palpation of skin and subcutaneous tissue shows no rashes, no indurations, subcutaneous nodules or tightening.\par \par \par • Abdomen- Nontender\par \par \par • Neurologic- CN 2-12 are grossly intact. No sensory or motor deficits in the upper and lower extremities. Adequate strength in upper and lower extremities \par \par \par • Psychiatric- Patient’s judgment and insight are intact. Oriented to time, place and person. Recent and remote memory intact.\par

## 2023-01-03 NOTE — DISCUSSION/SUMMARY
[de-identified] : Ms. Perez is a 36-year-old female with a chief complaint of left leg pain.  She has had this pain for about 2 months following an injury in which she fractured her ankle in 2 places. Patient states that pain is made worse by a left ankle surgery which took place on 07/13/2022. She notes that the use of medication provides her with at least 80% relief throughout the day. At this time we will continue weaning the patient off of her narcotic medication. She will decrease her dosage of medication to Percocet 5-325mg once daily as well as nortriptyline, one tablet at night. Overall there is at least a 30-50% reduction in pain with the prescribed analgesics. The patient denies any adverse side effects due to the medication (sleeping disturbance, constipation, sleepiness, hallucinations and/or urination problems). \par \par Patient continues to have severe pain in the left ankle and foot. She has physical findings of hyperalgesia and allodynia. Patient has history and physical findings that support a sympathetic pain syndrome in the left lower extremity. Patient has physical findings of allodynia, hyperalgesia decreased range of motion and pseudo-motor changes. Schedule a lumbar sympathetic plexus on the left side. Risk, benefits, pros and cons of procedure were explained to the patient using models and diagrams and their questions were answered. Patient has not yet undergone this injection but wishes to do so in the future and therefore we will reschedule her. \par \par The patient has severe anxiety of procedures that necessitates monitored anesthesia care (MAC). The procedure performed will be close to major nerves, arteries, and spinal cord and/or joint structures. Due to the proximity of these structures, we need the patient to be still during the procedure.  With the help of MAC, this will be safely achieved and decrease the risk of any complications.\par \par In the interim, it is medically necessary for the patient to utilize a left foot and ankle boot to minimize impact on her injured foot and increase blood circulation in the left lower extremity. \par \par At this time, she is scheduled to consult with Dr. Cancino for a 2nd opinion in regard to her left leg and ankle pain.  All of this patient's questions were answered and she understood our conversation well. \par \par We have spent approximately 5-10 minutes discussing the importance of smoking cessation and suggested a follow up with a primary care doctor to see if the doctor can aid in those endeavors. \par \par UDS done at last encounter on 8/9/22 was consistent. To be repeated today. \par \par I, Makenzie Parisi, attest that this documentation has been prepared under the direction and in the presence of Provider Epi Dangelo DO\par The documentation recorded by the scribe, in my presence, accurately reflects the service I personally performed, and the decisions made by me with my edits as appropriate.\par \par Best Regards, \par Epi Dangelo, D.O. \par Diplomat, American Board of Anesthesiology \par Diplomat, American Board of Pain Medicine \par Diplomat, American Board of Pain Management

## 2023-01-16 ENCOUNTER — TRANSCRIPTION ENCOUNTER (OUTPATIENT)
Age: 38
End: 2023-01-16

## 2023-01-19 ENCOUNTER — APPOINTMENT (OUTPATIENT)
Dept: PAIN MANAGEMENT | Facility: CLINIC | Age: 38
End: 2023-01-19
Payer: MEDICAID

## 2023-01-19 PROCEDURE — 93770 DETERMINATION VENOUS PRESS: CPT

## 2023-01-19 PROCEDURE — 64520 N BLOCK LUMBAR/THORACIC: CPT | Mod: LT

## 2023-01-19 PROCEDURE — 99152Z: CUSTOM

## 2023-01-19 PROCEDURE — 77003 FLUOROGUIDE FOR SPINE INJECT: CPT | Mod: 79

## 2023-01-19 PROCEDURE — 93040 RHYTHM ECG WITH REPORT: CPT | Mod: 79

## 2023-01-19 NOTE — PROCEDURE
[FreeTextEntry3] : LUMBAR SYMPATHETIC GANGLION INJECTION UNDER FLUOROSCOPY\par \par Preoperative Diagnosis: CRPS of the /left lower extremity\par Postoperative Diagnosis: Same\par Procedure: Left Lumbar Sympathetic Injection under Fluoroscopic Guidance\par Physician: Epi Dangelo D.O.\par \par Anesthesia: See Nurses note. Sedation/ Cold spray\par \par Medical Necessity: Failure of conservative management.\par \par Indication for Fluoroscopy:  This procedure requires the precise placement of the spinal needle to the sympathetic chain.  It is the only way to accurately and safely perform the injection.\par \par CONSENT: The possible complications including infection, bleeding, nerve damage, Hospital admission, death or failure of the procedure; though unusual, are theoretically possible. The patient was educated about the of the procedure and alternative therapies. All questions were answered and the patient freely gave consent to proceed.\par \par Monitoring:  Patient had continuous blood pressure, EKG, and pulse oximetry throughout the case. See nurse's notes.\par \par  PROCEDURE NOTE:\par After obtaining written consent, the patient was placed on the fluoroscopic table in the prone position with the pillow placed under the hips. The lumbar area was prepped with Betadine. A time out was performed.  Cold spray was used to localize the area. The L2 though L4 spinous processes were identified fluoroscopically.  The image was rotated using the oblique view until the L3 transverse process appeared to be within the substance of the vertebral body. Skin was sprayed with cold spray. A 22g 5 inch needle was advanced under fluoroscopy guidance at the L3 level until the vertebral body was contacted.  Final needle position using AP and lateral images was confirmed fluoroscopically, the needle tip was noted to be at the anterolateral border of the vertebral body. Subsequently, 2 ml of Omnipaque 240 were injected and spread along the sympathetic chain was noted. Then, a solution of 10ml of 0.5% bupivacaine and 2% lidocaine with 40 mg of Methylprednisolone was administered. L4 level done in similar fashion. There were no signs of, intravascular block or hypotension. The needle was removed intact. A band aid was place on the site. The L4 level was done in similar fashion. \par \par \par Complications: None. The patient tolerated the procedure well. \par \par Disposition: I have examined the patient and there are no new physical findings since the original presentation.  Sensory and motor function were intact. The patient met discharge criteria see nurses notes. The discharge instruction sheet was reviewed and given to the patient. The patient was discharged home with a . If patient gets sustained relief will have patient do recombinant bike and/ or Elliptical machine (with hands planted) at 1 intensity starting at 50 RPMs building to 70 RPMs, starting at 5 minutes building to 30 minutes 3 days in a row with a day break.\par \par Comment: Depending on effectiveness would repeat in 1-2 weeks vs follow up in office depending on insurance. Call if any problems.\par \par Epi Dangelo D.O.\par Diplomat, American Board of Anesthesiology\par Diplomat, American Board of Pain Medicine\par Diplomat, American Board of Pain Management\par \par \par \par

## 2023-01-30 ENCOUNTER — APPOINTMENT (OUTPATIENT)
Dept: PAIN MANAGEMENT | Facility: CLINIC | Age: 38
End: 2023-01-30
Payer: MEDICAID

## 2023-01-30 VITALS
DIASTOLIC BLOOD PRESSURE: 89 MMHG | HEIGHT: 67 IN | WEIGHT: 180 LBS | HEART RATE: 87 BPM | BODY MASS INDEX: 28.25 KG/M2 | SYSTOLIC BLOOD PRESSURE: 157 MMHG

## 2023-01-30 PROCEDURE — 99213 OFFICE O/P EST LOW 20 MIN: CPT

## 2023-01-30 RX ORDER — OXYCODONE AND ACETAMINOPHEN 10; 325 MG/1; MG/1
10-325 TABLET ORAL TWICE DAILY
Qty: 30 | Refills: 0 | Status: DISCONTINUED | COMMUNITY
Start: 2022-08-09 | End: 2023-01-30

## 2023-01-30 RX ORDER — NORTRIPTYLINE HYDROCHLORIDE 10 MG/1
10 CAPSULE ORAL
Qty: 30 | Refills: 0 | Status: DISCONTINUED | COMMUNITY
Start: 2022-10-24 | End: 2023-01-30

## 2023-01-30 RX ORDER — OXYCODONE AND ACETAMINOPHEN 5; 325 MG/1; MG/1
5-325 TABLET ORAL
Qty: 60 | Refills: 0 | Status: DISCONTINUED | COMMUNITY
Start: 2022-10-24 | End: 2023-01-30
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
## 2023-01-30 NOTE — DISCUSSION/SUMMARY
[Medication Risks Reviewed] : Medication risks reviewed [de-identified] : Ms. Perez is a 37-year-old female with a chief complaint of left leg pain.  She has had this pain for about 2 months following an injury in which she fractured her ankle in 2 places. Patient states that pain is made worse by a left ankle surgery which took place on 07/13/2022. She notes that the use of medication provides her with at least 80% relief throughout the day. At this time we will continue weaning the patient off of her narcotic medication. She will decrease her dosage of medication to Percocet 5-325mg once daily as well as Nortriptyline, one tablet at night. Overall there is at least a 30-50% reduction in pain with the prescribed analgesics. The patient denies any adverse side effects due to the medication (sleeping disturbance, constipation, sleepiness, hallucinations and/or urination problems). \par \par Patient continues to have pain in the left ankle and foot. She has physical findings of hyperalgesia and allodynia. Patient has history and physical findings that support a sympathetic pain syndrome in the left lower extremity. Patient has physical findings of allodynia, hyperalgesia decreased range of motion and pseudo-motor changes. Patient is s/p Sympathetic Nervae block on 01/19/2023 with only 25% relief,so we will hold off on the next injection until re-evaluation in 4 week.\par \par In the interim, it is medically necessary for the patient to utilize a left foot and ankle boot to minimize impact on her injured foot and increase blood circulation in the left lower extremity. \par \par At this time, she is scheduled to consult with Dr. Cancino for a 2nd opinion in regard to her left leg and ankle pain.  All of this patient's questions were answered and she understood our conversation well. \par \par \par Raman Chi PA-C\par Epi Dangelo D.O. \par

## 2023-01-30 NOTE — PHYSICAL EXAM
[de-identified] : GENERAL APPEARANCE OF PATIENT IS WELL DEVELOPED, WELL NOURISHED, BODY HABITUS NORMAL, WELL GROOMED, NO DEFORMITIES NOTED. \par Head - Atraumatic and Normocephalic \par Eyes, Nose, and Throat: External inspection of ears and nose are normal overall without scars, lesions, or masses noted. Assessment of hearing is normal\par Neck-Examination of neck shows no masses, overall appearance is normal, neck is symmetric, tracheal position is midline, no crepitus is noted. Examination of thyroid shows no enlargement, tenderness or masses\par Respiratory- Assessment of respiratory effort shows no intercostal retractions, no use of accessory muscles, unlabored breathing, and normal diaphragmatic movement.\par Cardiovascular- Examination of extremities show no edema or varicosities\par Musculoskeletal. Examination of gait is not antalgic and station is normal\par Inspection and palpation of digits and nails shows no clubbing, cyanosis, nodules, drainage, fluctuance, petechiae\par \par • Spine – inspection and palpation shows no misalignment, asymmetry, crepitation, defects, tenderness, masses, effusions. ROM is normal without crepitation or contracture. No instability or subluxation or laxity is noted. No abnormal movements.\par \par \par • Neck- inspection and palpation shows no misalignment, asymmetry, crepitation, defects, tenderness, masses, effusions. ROM is normal without crepitation or contracture. No instability or subluxation or laxity is noted. No abnormal movements.\par \par \par • RUE- inspection and palpation shows no misalignment, asymmetry, crepitation, defects, tenderness, masses, effusions. ROM is normal without crepitation or contracture. No instability or subluxation or laxity is noted. No abnormal movements.\par \par \par • LUE- inspection and palpation shows no misalignment, asymmetry, crepitation, defects, tenderness, masses, effusions. ROM is normal without crepitation or contracture. No instability or subluxation or laxity is noted. No abnormal movements.\par \par \par • RLE- inspection and palpation shows no misalignment, asymmetry, crepitation, defects, tenderness, masses, effusions. ROM is normal without crepitation or contracture. No instability or subluxation or laxity is noted. No abnormal movements.\par \par \par • LLE- allodynia and hyperalgesia 2/3 down the left leg. Multiple incisions on the left foot and ankle. Incisions are healing well no signs of infection. Swelling on the left foot. \par \par \par • Skin- Inspection of skin and subcutaneous tissue shows no rashes, lesions or ulcers. Palpation of skin and subcutaneous tissue shows no rashes, no indurations, subcutaneous nodules or tightening.\par \par \par • Abdomen- Nontender\par \par \par • Neurologic- CN 2-12 are grossly intact. No sensory or motor deficits in the upper and lower extremities. Adequate strength in upper and lower extremities \par \par \par • Psychiatric- Patient’s judgment and insight are intact. Oriented to time, place and person. Recent and remote memory intact.\par

## 2023-01-31 ENCOUNTER — APPOINTMENT (OUTPATIENT)
Dept: PAIN MANAGEMENT | Facility: CLINIC | Age: 38
End: 2023-01-31

## 2023-03-03 ENCOUNTER — APPOINTMENT (OUTPATIENT)
Dept: PAIN MANAGEMENT | Facility: CLINIC | Age: 38
End: 2023-03-03
Payer: MEDICAID

## 2023-03-03 VITALS
DIASTOLIC BLOOD PRESSURE: 89 MMHG | HEIGHT: 67 IN | WEIGHT: 180 LBS | BODY MASS INDEX: 28.25 KG/M2 | HEART RATE: 86 BPM | SYSTOLIC BLOOD PRESSURE: 142 MMHG

## 2023-03-03 PROCEDURE — 99213 OFFICE O/P EST LOW 20 MIN: CPT

## 2023-03-03 NOTE — DISCUSSION/SUMMARY
Patient call dropped to [facility]  - Medication Risks Reviewed] : Medication risks reviewed [de-identified] : Ms. Perez is a 37-year-old female with a chief complaint of left leg pain.  She has had this pain for about 2 months following an injury in which she fractured her ankle in 2 places. Patient states that pain is made worse by a left ankle surgery which took place on 07/13/2022. She notes that the use of medication provides her with at least 80% relief throughout the day.  The patient denies any adverse side effects due to the medication (sleeping disturbance, constipation, sleepiness, hallucinations and/or urination problems). \par \par Patient continues to have pain in the left ankle and foot. She has physical findings of hyperalgesia and allodynia. Patient has history and physical findings that support a sympathetic pain syndrome in the left lower extremity. Patient has physical findings of allodynia, hyperalgesia decreased range of motion and pseudo-motor changes. Patient is s/p Sympathetic Nerve block on 01/19/2023 with only 25% relief,so we will hold off on the next injection at this time.\par \par In the interim, it is medically necessary for the patient to utilize a left foot and ankle boot to minimize impact on her injured foot and increase blood circulation in the left lower extremity. \par \par  All of this patient's questions were answered and she understood our conversation well. \par \par \par Raman Chi PA-C\par Epi Dangelo D.O. \par

## 2023-03-03 NOTE — PHYSICAL EXAM
[de-identified] : GENERAL APPEARANCE OF PATIENT IS WELL DEVELOPED, WELL NOURISHED, BODY HABITUS NORMAL, WELL GROOMED, NO DEFORMITIES NOTED. \par Head - Atraumatic and Normocephalic \par Eyes, Nose, and Throat: External inspection of ears and nose are normal overall without scars, lesions, or masses noted. Assessment of hearing is normal\par Neck-Examination of neck shows no masses, overall appearance is normal, neck is symmetric, tracheal position is midline, no crepitus is noted. Examination of thyroid shows no enlargement, tenderness or masses\par Respiratory- Assessment of respiratory effort shows no intercostal retractions, no use of accessory muscles, unlabored breathing, and normal diaphragmatic movement.\par Cardiovascular- Examination of extremities show no edema or varicosities\par Musculoskeletal. Examination of gait is not antalgic and station is normal\par Inspection and palpation of digits and nails shows no clubbing, cyanosis, nodules, drainage, fluctuance, petechiae\par \par • Spine – inspection and palpation shows no misalignment, asymmetry, crepitation, defects, tenderness, masses, effusions. ROM is normal without crepitation or contracture. No instability or subluxation or laxity is noted. No abnormal movements.\par \par \par • Neck- inspection and palpation shows no misalignment, asymmetry, crepitation, defects, tenderness, masses, effusions. ROM is normal without crepitation or contracture. No instability or subluxation or laxity is noted. No abnormal movements.\par \par \par • RUE- inspection and palpation shows no misalignment, asymmetry, crepitation, defects, tenderness, masses, effusions. ROM is normal without crepitation or contracture. No instability or subluxation or laxity is noted. No abnormal movements.\par \par \par • LUE- inspection and palpation shows no misalignment, asymmetry, crepitation, defects, tenderness, masses, effusions. ROM is normal without crepitation or contracture. No instability or subluxation or laxity is noted. No abnormal movements.\par \par \par • RLE- inspection and palpation shows no misalignment, asymmetry, crepitation, defects, tenderness, masses, effusions. ROM is normal without crepitation or contracture. No instability or subluxation or laxity is noted. No abnormal movements.\par \par \par • LLE- allodynia and hyperalgesia 2/3 down the left leg. Multiple incisions on the left foot and ankle. Incisions are healing well no signs of infection. Swelling on the left foot. \par \par \par • Skin- Inspection of skin and subcutaneous tissue shows no rashes, lesions or ulcers. Palpation of skin and subcutaneous tissue shows no rashes, no indurations, subcutaneous nodules or tightening.\par \par \par • Abdomen- Nontender\par \par \par • Neurologic- CN 2-12 are grossly intact. No sensory or motor deficits in the upper and lower extremities. Adequate strength in upper and lower extremities \par \par \par • Psychiatric- Patient’s judgment and insight are intact. Oriented to time, place and person. Recent and remote memory intact.\par
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
## 2023-04-03 ENCOUNTER — LABORATORY RESULT (OUTPATIENT)
Age: 38
End: 2023-04-03

## 2023-04-03 ENCOUNTER — APPOINTMENT (OUTPATIENT)
Dept: PAIN MANAGEMENT | Facility: CLINIC | Age: 38
End: 2023-04-03
Payer: MEDICAID

## 2023-04-03 VITALS
BODY MASS INDEX: 28.25 KG/M2 | SYSTOLIC BLOOD PRESSURE: 156 MMHG | HEART RATE: 94 BPM | WEIGHT: 180 LBS | HEIGHT: 67 IN | DIASTOLIC BLOOD PRESSURE: 95 MMHG

## 2023-04-03 VITALS — BODY MASS INDEX: 28.25 KG/M2 | WEIGHT: 180 LBS | HEIGHT: 67 IN

## 2023-04-03 PROCEDURE — 99213 OFFICE O/P EST LOW 20 MIN: CPT

## 2023-04-03 NOTE — PHYSICAL EXAM
[de-identified] : GENERAL APPEARANCE OF PATIENT IS WELL DEVELOPED, WELL NOURISHED, BODY HABITUS NORMAL, WELL GROOMED, NO DEFORMITIES NOTED. \par Head - Atraumatic and Normocephalic \par Eyes, Nose, and Throat: External inspection of ears and nose are normal overall without scars, lesions, or masses noted. Assessment of hearing is normal\par Neck-Examination of neck shows no masses, overall appearance is normal, neck is symmetric, tracheal position is midline, no crepitus is noted. Examination of thyroid shows no enlargement, tenderness or masses\par Respiratory- Assessment of respiratory effort shows no intercostal retractions, no use of accessory muscles, unlabored breathing, and normal diaphragmatic movement.\par Cardiovascular- Examination of extremities show no edema or varicosities\par Musculoskeletal. Examination of gait is not antalgic and station is normal\par Inspection and palpation of digits and nails shows no clubbing, cyanosis, nodules, drainage, fluctuance, petechiae\par \par • Spine – inspection and palpation shows no misalignment, asymmetry, crepitation, defects, tenderness, masses, effusions. ROM is normal without crepitation or contracture. No instability or subluxation or laxity is noted. No abnormal movements.\par \par \par • Neck- inspection and palpation shows no misalignment, asymmetry, crepitation, defects, tenderness, masses, effusions. ROM is normal without crepitation or contracture. No instability or subluxation or laxity is noted. No abnormal movements.\par \par \par • RUE- inspection and palpation shows no misalignment, asymmetry, crepitation, defects, tenderness, masses, effusions. ROM is normal without crepitation or contracture. No instability or subluxation or laxity is noted. No abnormal movements.\par \par \par • LUE- inspection and palpation shows no misalignment, asymmetry, crepitation, defects, tenderness, masses, effusions. ROM is normal without crepitation or contracture. No instability or subluxation or laxity is noted. No abnormal movements.\par \par \par • RLE- inspection and palpation shows no misalignment, asymmetry, crepitation, defects, tenderness, masses, effusions. ROM is normal without crepitation or contracture. No instability or subluxation or laxity is noted. No abnormal movements.\par \par \par • LLE- allodynia and hyperalgesia 2/3 down the left leg. Multiple incisions on the left foot and ankle. Incisions are healing well no signs of infection. Swelling on the left foot. \par \par \par • Skin- Inspection of skin and subcutaneous tissue shows no rashes, lesions or ulcers. Palpation of skin and subcutaneous tissue shows no rashes, no indurations, subcutaneous nodules or tightening.\par \par \par • Abdomen- Nontender\par \par \par • Neurologic- CN 2-12 are grossly intact. No sensory or motor deficits in the upper and lower extremities. Adequate strength in upper and lower extremities \par \par \par • Psychiatric- Patient’s judgment and insight are intact. Oriented to time, place and person. Recent and remote memory intact.\par
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
## 2023-04-03 NOTE — DISCUSSION/SUMMARY
[Medication Risks Reviewed] : Medication risks reviewed [de-identified] : Ms. Perez is a 37-year-old female with a chief complaint of left leg pain.  She has had this pain following an injury in which she fractured her ankle in 2 places. Patient states that pain is made worse by a left ankle surgery which took place on 07/13/2022. She notes that the use of medication provides her with at least 80% relief throughout the day.  The patient denies any adverse side effects due to the medication (sleeping disturbance, constipation, sleepiness, hallucinations and/or urination problems). \par \par Patient continues to have pain in the left ankle and foot. She has physical findings of hyperalgesia and allodynia. Patient has history and physical findings that support a sympathetic pain syndrome in the left lower extremity. Patient has physical findings of allodynia, hyperalgesia decreased range of motion and pseudo-motor changes. Patient is s/p Sympathetic Nerve block on 01/19/2023 with only 25% relief,so we will hold off on the next injection at this time.\par \par UDS will repeat today.\par \par In the interim, it is medically necessary for the patient to utilize a left foot and ankle boot to minimize impact on her injured foot and increase blood circulation in the left lower extremity. \par \par  All of this patient's questions were answered and she understood our conversation well. \par \par \par Raman Chi PA-C\par Epi Dangelo D.O. \par

## 2023-04-07 LAB
PM 6 MAM: NEGATIVE NG/ML
PM 7-AMINO-CLONAZ: NEGATIVE NG/ML
PM ALPHA-HYDROXY-ALPRAZOLAM: NEGATIVE NG/ML
PM ALPHA-HYDROXY-MIDAZOLAM: NEGATIVE NG/ML
PM ALPRAZOLAM: NEGATIVE NG/ML
PM AMOBARBITAL: NEGATIVE NG/ML
PM AMPHETAMINE INTERP: NEGATIVE
PM AMPHETAMINE: NEGATIVE NG/ML
PM BARBURATES INTERP: NEGATIVE
PM BEG: NEGATIVE NG/ML
PM BENZODIAZEPINES INTERP: NEGATIVE
PM BUPRENORPHINE INTERP: NEGATIVE
PM BUPRENORPHINE: NEGATIVE NG/ML
PM BUTALBITAL: NEGATIVE NG/ML
PM CLONAZEPAM: NEGATIVE NG/ML
PM COCAINE INTERP: NEGATIVE
PM COCAINE: NEGATIVE NG/ML
PM CODIENE: NEGATIVE NG/ML
PM COTININE: >1000 NG/ML
PM DIAZEPAM: NEGATIVE NG/ML
PM DIHYROCODEINE: NEGATIVE NG/ML
PM EDDP: NEGATIVE NG/ML
PM FENTANYL INTERP: NEGATIVE
PM FENTANYL: NEGATIVE NG/ML
PM FLUNITRAZEPAM: NEGATIVE NG/ML
PM FLURAZEPAM: NEGATIVE NG/ML
PM HYDROCODONE: NEGATIVE NG/ML
PM HYDROMORPHONE: NEGATIVE NG/ML
PM LORAZEPAM: NEGATIVE NG/ML
PM MARIJUANA (DELTA-9-THC): NEGATIVE NG/ML
PM MARIJUANA INTERP: NEGATIVE
PM MDA: NEGATIVE NG/ML
PM MDEA: NEGATIVE NG/ML
PM MDMA: NEGATIVE NG/ML
PM MEPERIDINE: NEGATIVE NG/ML
PM METHADONE INTERP: NEGATIVE
PM METHADONE: NEGATIVE NG/ML
PM METHAMPHETAMINE: NEGATIVE NG/ML
PM MIDAZOLAM: NEGATIVE NG/ML
PM MORPHINE: NEGATIVE NG/ML
PM NALOXONE: NEGATIVE NG/ML
PM NALTREXONE: NEGATIVE NG/ML
PM NICOTINE INTERP: POSITIVE
PM NORBUPRENORPHINE: NEGATIVE NG/ML
PM NORDIAZEPAM: NEGATIVE NG/ML
PM NORMEPERIDINE: NEGATIVE NG/ML
PM NOROXYCODONE: 186 NG/ML
PM OPIOID INTERP: NEGATIVE
PM OXAZEPAM: NEGATIVE NG/ML
PM OXXYCODONE INTERP: POSITIVE
PM OXYCODONE: 73 NG/ML
PM OXYMORPHONE: NEGATIVE NG/ML
PM PCP: NEGATIVE NG/ML
PM PHENCYCLIDINE INTERP: NEGATIVE
PM PHENOBARBITAL: NEGATIVE NG/ML
PM PPX: NEGATIVE NG/ML
PM PROPOXYPHENE INTERP: NEGATIVE
PM SECOBARBITAL: NEGATIVE NG/ML
PM SUFENTANIL: NEGATIVE NG/ML
PM TAPENTADOL: NEGATIVE NG/ML
PM TEMAZEPAM: NEGATIVE NG/ML
PM TRAMADOL INTERP: NEGATIVE
PM TRAMADOL: NEGATIVE NG/ML

## 2023-05-01 ENCOUNTER — APPOINTMENT (OUTPATIENT)
Dept: PAIN MANAGEMENT | Facility: CLINIC | Age: 38
End: 2023-05-01
Payer: MEDICAID

## 2023-05-01 VITALS
DIASTOLIC BLOOD PRESSURE: 89 MMHG | HEIGHT: 67 IN | HEART RATE: 86 BPM | SYSTOLIC BLOOD PRESSURE: 142 MMHG | BODY MASS INDEX: 28.25 KG/M2 | WEIGHT: 180 LBS

## 2023-05-01 PROCEDURE — 99213 OFFICE O/P EST LOW 20 MIN: CPT

## 2023-05-01 NOTE — DISCUSSION/SUMMARY
[Medication Risks Reviewed] : Medication risks reviewed [de-identified] : Ms. Perez is a 37-year-old female with a chief complaint of left leg pain.  She has had this pain following an injury in which she fractured her ankle in 2 places. Patient states that pain is made worse by a left ankle surgery which took place on 07/13/2022. She notes that the use of medication provides her with at least 80% relief throughout the day and allow her to perform ADLs.  The patient denies any adverse side effects due to the medication (sleeping disturbance, constipation, sleepiness, hallucinations and/or urination problems). \par \par Patient continues to have pain in the left ankle and foot. She has physical findings of hyperalgesia and allodynia. Patient has history and physical findings that support a sympathetic pain syndrome in the left lower extremity. Patient has physical findings of allodynia, hyperalgesia decreased range of motion and pseudo-motor changes. Patient is s/p Sympathetic Nerve block on 01/19/2023 with only 25% relief,so we will hold off on the next injection at this time.\par Patient is currently doing Physical Therapy and reports moderate relief and will continue,new script was given today.\par \par UDS from 04/03/23 is consistent.\par \par In the interim, it is medically necessary for the patient to utilize a left foot and ankle boot to minimize impact on her injured foot and increase blood circulation in the left lower extremity. \par \par  All of this patient's questions were answered and she understood our conversation well. \par \par \par Raman Chi PA-C\par Epi Dangelo D.O. \par
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
## 2023-05-01 NOTE — PHYSICAL EXAM
[de-identified] : GENERAL APPEARANCE OF PATIENT IS WELL DEVELOPED, WELL NOURISHED, BODY HABITUS NORMAL, WELL GROOMED, NO DEFORMITIES NOTED. \par Head - Atraumatic and Normocephalic \par Eyes, Nose, and Throat: External inspection of ears and nose are normal overall without scars, lesions, or masses noted. Assessment of hearing is normal\par Neck-Examination of neck shows no masses, overall appearance is normal, neck is symmetric, tracheal position is midline, no crepitus is noted. Examination of thyroid shows no enlargement, tenderness or masses\par Respiratory- Assessment of respiratory effort shows no intercostal retractions, no use of accessory muscles, unlabored breathing, and normal diaphragmatic movement.\par Cardiovascular- Examination of extremities show no edema or varicosities\par Musculoskeletal. Examination of gait is not antalgic and station is normal\par Inspection and palpation of digits and nails shows no clubbing, cyanosis, nodules, drainage, fluctuance, petechiae\par \par • Spine – inspection and palpation shows no misalignment, asymmetry, crepitation, defects, tenderness, masses, effusions. ROM is normal without crepitation or contracture. No instability or subluxation or laxity is noted. No abnormal movements.\par \par \par • Neck- inspection and palpation shows no misalignment, asymmetry, crepitation, defects, tenderness, masses, effusions. ROM is normal without crepitation or contracture. No instability or subluxation or laxity is noted. No abnormal movements.\par \par \par • RUE- inspection and palpation shows no misalignment, asymmetry, crepitation, defects, tenderness, masses, effusions. ROM is normal without crepitation or contracture. No instability or subluxation or laxity is noted. No abnormal movements.\par \par \par • LUE- inspection and palpation shows no misalignment, asymmetry, crepitation, defects, tenderness, masses, effusions. ROM is normal without crepitation or contracture. No instability or subluxation or laxity is noted. No abnormal movements.\par \par \par • RLE- inspection and palpation shows no misalignment, asymmetry, crepitation, defects, tenderness, masses, effusions. ROM is normal without crepitation or contracture. No instability or subluxation or laxity is noted. No abnormal movements.\par \par \par • LLE- allodynia and hyperalgesia 2/3 down the left leg. Multiple incisions on the left foot and ankle. Incisions are healing well no signs of infection. Swelling on the left foot. \par \par \par • Skin- Inspection of skin and subcutaneous tissue shows no rashes, lesions or ulcers. Palpation of skin and subcutaneous tissue shows no rashes, no indurations, subcutaneous nodules or tightening.\par \par \par • Abdomen- Nontender\par \par \par • Neurologic- CN 2-12 are grossly intact. No sensory or motor deficits in the upper and lower extremities. Adequate strength in upper and lower extremities \par \par \par • Psychiatric- Patient’s judgment and insight are intact. Oriented to time, place and person. Recent and remote memory intact.\par

## 2023-05-10 ENCOUNTER — APPOINTMENT (OUTPATIENT)
Dept: PRIMARY CARE | Facility: CLINIC | Age: 38
End: 2023-05-10
Payer: COMMERCIAL

## 2023-05-11 ENCOUNTER — EMERGENCY (EMERGENCY)
Facility: HOSPITAL | Age: 38
LOS: 0 days | Discharge: HOME | End: 2023-05-12
Attending: STUDENT IN AN ORGANIZED HEALTH CARE EDUCATION/TRAINING PROGRAM
Payer: MEDICAID

## 2023-05-11 ENCOUNTER — APPOINTMENT (OUTPATIENT)
Dept: PRIMARY CARE | Facility: CLINIC | Age: 38
End: 2023-05-11
Payer: COMMERCIAL

## 2023-05-11 VITALS
WEIGHT: 175.93 LBS | OXYGEN SATURATION: 100 % | TEMPERATURE: 99 F | SYSTOLIC BLOOD PRESSURE: 119 MMHG | RESPIRATION RATE: 18 BRPM | HEART RATE: 87 BPM | DIASTOLIC BLOOD PRESSURE: 73 MMHG

## 2023-05-11 DIAGNOSIS — R20.2 PARESTHESIA OF SKIN: ICD-10-CM

## 2023-05-11 DIAGNOSIS — M79.641 PAIN IN RIGHT HAND: ICD-10-CM

## 2023-05-11 DIAGNOSIS — R20.0 ANESTHESIA OF SKIN: ICD-10-CM

## 2023-05-11 DIAGNOSIS — F17.200 NICOTINE DEPENDENCE, UNSPECIFIED, UNCOMPLICATED: ICD-10-CM

## 2023-05-11 DIAGNOSIS — M79.642 PAIN IN LEFT HAND: ICD-10-CM

## 2023-05-11 PROCEDURE — 99283 EMERGENCY DEPT VISIT LOW MDM: CPT | Mod: 25

## 2023-05-11 PROCEDURE — 99283 EMERGENCY DEPT VISIT LOW MDM: CPT

## 2023-05-11 PROCEDURE — 73130 X-RAY EXAM OF HAND: CPT | Mod: RT

## 2023-05-11 NOTE — ED ADULT TRIAGE NOTE - CHIEF COMPLAINT QUOTE
Pt came c/o right hand numbness and pain since 2am yesterday Pt came c/o right hand numbness and pain since 2am yesterday, feels the same sensation in both hands and arms, no other neurological deficits.

## 2023-05-12 PROCEDURE — 73130 X-RAY EXAM OF HAND: CPT | Mod: 26,RT

## 2023-05-12 NOTE — ED ADULT NURSE NOTE - NSFALLUNIVINTERV_ED_ALL_ED
Bed/Stretcher in lowest position, wheels locked, appropriate side rails in place/Call bell, personal items and telephone in reach/Instruct patient to call for assistance before getting out of bed/chair/stretcher/Non-slip footwear applied when patient is off stretcher/[place] to call system/Physically safe environment - no spills, clutter or unnecessary equipment/Purposeful proactive rounding/Room/bathroom lighting operational, light cord in reach
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
## 2023-05-12 NOTE — ED PROVIDER NOTE - OBJECTIVE STATEMENT
pt presents to ED c/o intermittent numbness, tingling and pain to b/l hands; R>L. of note, she sts something fell onto her R hand yesterday. pain is sharp, nonradiating, moderate. denies exacerbating or relieving factors, but sxs occur after laying on her side, using her phone a lot, or typing on the computer. Denies fever/chill/HA/dizziness/chest pain/palpitation/sob/abd pain/n/v/d/ black stool/bloody stool/urinary sxs

## 2023-05-12 NOTE — ED ADULT NURSE NOTE - CHIEF COMPLAINT QUOTE
Pt came c/o right hand numbness and pain since 2am yesterday, feels the same sensation in both hands and arms, no other neurological deficits.

## 2023-05-12 NOTE — ED PROVIDER NOTE - CARE PROVIDER_API CALL
Tony Bates)  Orthopaedic Surgery  [address] [place] [address]  Phone: [phone]  Fax: [phone]  Follow Up Time:

## 2023-05-12 NOTE — ED PROVIDER NOTE - NPI NUMBER (FOR SYSADMIN USE ONLY) :
Zetia prescription sent to [facility] pharmacy for 10 mg one tablet daily #30 with 4 refills.   
[[id number]]

## 2023-05-12 NOTE — ED PROVIDER NOTE - PHYSICAL EXAMINATION
CONSTITUTIONAL: Well-appearing; well-nourished; in no apparent distress.   NECK: Supple; non-tender; no cervical lymphadenopathy.   CARDIOVASCULAR: Normal S1, S2; no murmurs, rubs, or gallops.   RESPIRATORY: Normal chest excursion with respiration  GI/: Non-distended; non-tender; no palpable organomegaly.   MS: ttp R hand dorsal aspect; No evidence of trauma or deformity. Normal ROM in all four extremities; otherwise non-tender to palpation; distal pulses are normal.   SKIN: Normal for age and race; warm; dry; good turgor; no apparent lesions or exudate.   NEURO/PSYCH: A & O x 4; grossly unremarkable. mood and manner are appropriate.

## 2023-05-12 NOTE — ED PROVIDER NOTE - PATIENT PORTAL LINK FT
You can access the FollowMyHealth Patient Portal offered by [facility] by registering at the following website: [email or web address]. By joining [facility]’s FollowMyHealth portal, you will also be able to view your health information using other applications (apps) compatible with our system.

## 2023-05-12 NOTE — ED PROVIDER NOTE - NS ED ATTENDING STATEMENT MOD
This was a shared visit with the ADAIR. I reviewed and verified the documentation and independently performed the documented:

## 2023-05-12 NOTE — ED PROVIDER NOTE - NSFOLLOWUPCLINICS_GEN_ALL_ED_FT
[facility]  Orthpedic  [address], [place]   Phone: [phone]  Fax:

## 2023-05-15 ENCOUNTER — OFFICE VISIT (OUTPATIENT)
Dept: PRIMARY CARE | Facility: CLINIC | Age: 38
End: 2023-05-15
Payer: COMMERCIAL

## 2023-05-15 ENCOUNTER — APPOINTMENT (OUTPATIENT)
Dept: ORTHOPEDIC SURGERY | Facility: CLINIC | Age: 38
End: 2023-05-15
Payer: MEDICAID

## 2023-05-15 VITALS — TEMPERATURE: 96 F | WEIGHT: 192 LBS | SYSTOLIC BLOOD PRESSURE: 110 MMHG | DIASTOLIC BLOOD PRESSURE: 70 MMHG

## 2023-05-15 VITALS — HEIGHT: 66 IN | BODY MASS INDEX: 28.93 KG/M2 | WEIGHT: 180 LBS

## 2023-05-15 DIAGNOSIS — R00.2 PALPITATIONS: ICD-10-CM

## 2023-05-15 DIAGNOSIS — F17.200 TOBACCO DEPENDENCE: ICD-10-CM

## 2023-05-15 DIAGNOSIS — R53.83 OTHER FATIGUE: ICD-10-CM

## 2023-05-15 DIAGNOSIS — L68.9 EXCESSIVE HAIR GROWTH: ICD-10-CM

## 2023-05-15 DIAGNOSIS — R63.5 WEIGHT GAIN: Primary | ICD-10-CM

## 2023-05-15 DIAGNOSIS — R43.0 ANOSMIA: ICD-10-CM

## 2023-05-15 PROCEDURE — 99203 OFFICE O/P NEW LOW 30 MIN: CPT

## 2023-05-15 PROCEDURE — 99204 OFFICE O/P NEW MOD 45 MIN: CPT | Performed by: FAMILY MEDICINE

## 2023-05-15 ASSESSMENT — ENCOUNTER SYMPTOMS
BLOOD IN STOOL: 0
ABDOMINAL PAIN: 0
CONSTIPATION: 0
NEUROLOGICAL NEGATIVE: 1
ANAL BLEEDING: 0
ENDOCRINE NEGATIVE: 1
DIARRHEA: 1
ABDOMINAL DISTENTION: 0
PALPITATIONS: 1
FATIGUE: 1
FEVER: 0

## 2023-05-15 ASSESSMENT — PATIENT HEALTH QUESTIONNAIRE - PHQ9
2. FEELING DOWN, DEPRESSED OR HOPELESS: NOT AT ALL
SUM OF ALL RESPONSES TO PHQ9 QUESTIONS 1 AND 2: 0
1. LITTLE INTEREST OR PLEASURE IN DOING THINGS: NOT AT ALL

## 2023-05-15 NOTE — PROGRESS NOTES
Patient ID: Shannan Tracey is a 37 y.o. female who presents for New Patient Visit (Establish care. Major weight gain, leon, hair growth, can just sit there and her heart will race fast happens twice a week. Wants to quit smoking. Possibly wants to get on ocps. Had covid in 2021, still can not smell. Wants to talk about her hot flashes).    Pt presents to establish care:     Pt feels she may have a hormone imbalance  Hair growth along her jaw line about 6 months ago  Hot flashes, worse at night  Racing heart   Weight gain    She had COVID 2021, Flu April 2023  With COVID she had fatigue and SOB, high fever as well, x 6 days   She still has loss of smell  She re gained her taste     She has changed her diet, she is walking   Menses are regular, heavy flow the first few days  She now has cramps with menses      P Med Hx: none  P Surg Hx:none    Fam Hx: Paternal GM- lung CA    Meds: none    Allergies: PCN- hives       Soc Hx: caffeine: coffee in the AM, no soda  Tobacco: she smokes one ppd  She wishes to go on Chantix, tried 2 years ago and quit for a year     She is a               Review of Systems   Constitutional:  Positive for fatigue. Negative for fever.   HENT:          Loss of smell since she had COVID 2021 persists    Cardiovascular:  Positive for palpitations. Negative for chest pain and leg swelling.   Gastrointestinal:  Positive for diarrhea. Negative for abdominal distention, abdominal pain, anal bleeding, blood in stool and constipation.   Endocrine: Negative.    Genitourinary: Negative.    Neurological: Negative.        Objective   /70   Temp 35.6 °C (96 °F)   Wt 87.1 kg (192 lb)   LMP 05/09/2023 (Approximate)     Physical Exam  Constitutional:       Appearance: Normal appearance.   HENT:      Head: Normocephalic.   Cardiovascular:      Rate and Rhythm: Normal rate and regular rhythm.      Heart sounds: Normal heart sounds.   Pulmonary:      Effort: Pulmonary effort is normal.       Breath sounds: Normal breath sounds.   Abdominal:      General: Abdomen is flat. Bowel sounds are normal.      Palpations: Abdomen is soft.   Neurological:      General: No focal deficit present.      Mental Status: She is alert and oriented to person, place, and time. Mental status is at baseline.   Psychiatric:         Mood and Affect: Mood normal.         Behavior: Behavior normal.         Thought Content: Thought content normal.         Judgment: Judgment normal.         Assessment/Plan   Diagnoses and all orders for this visit:  Weight gain  -     Comprehensive metabolic panel; Future  -     Tsh With Reflex To Free T4 If Abnormal; Future  -     Lipid Panel; Future  -     17-Hydroxyprogesterone; Future  -     Comprehensive metabolic panel; Future  -     Insulin, fasting; Future  -     Prolactin; Future  -     Testosterone, free, total; Future  -     TSH; Future  -     US pelvis; Future  -     FSH; Future  -     Luteinizing hormone; Future  Excessive hair growth  -     Comprehensive metabolic panel; Future  -     Tsh With Reflex To Free T4 If Abnormal; Future  -     Lipid Panel; Future  -     17-Hydroxyprogesterone; Future  -     Comprehensive metabolic panel; Future  -     Insulin, fasting; Future  -     Prolactin; Future  -     Testosterone, free, total; Future  -     TSH; Future  -     US pelvis; Future  -     FSH; Future  -     Luteinizing hormone; Future  Tobacco dependence  Palpitations  -     Holter or Event Cardiac Monitor; Future  Other fatigue  -     Vitamin B12; Future  -     Vitamin D 25-Hydroxy,Total; Future  Anosmia  May need to see ENT in the future    Discussed the importance of a healthy diet and regular exercise   Follow up pending lab results

## 2023-05-15 NOTE — DATA REVIEWED
[FreeTextEntry1] : X-RAy rT hand [facility]: \par There is age indeterminate deformity of the distal fifth phalanx ventrally.\par \par A mildly displaced fracture is not excluded

## 2023-05-15 NOTE — DISCUSSION/SUMMARY
[de-identified] : Discussed with patient detail that symptoms are consistent with carpal tunnel.  Discussed treatment patient including rest, ice/heat, warm water Epsom salt soaks, anti-inflammatory medications, bracing.  Patient fitted for cock-up wrist brace, discussed usage.  Naproxen 500 mg twice daily sent to patient's pharmacy, discussed side effects in detail.  Discussed with patient that if conservative measures fail we will possibly send her for for EMG/possible surgical consult.  Patient understands agrees with plan, will follow-up with hand department in 4 to 6 weeks.

## 2023-05-15 NOTE — HISTORY OF PRESENT ILLNESS
[de-identified] : Patient is a 37-year-old female for evaluation of right hand.  Patient states she has history of an ankle fracture and over the past few months she has been using crutches/cane and putting pressure on her wrist.  Patient states since then she has been having numbness/tingling in her hand/wrist area.  Patient states it worsens at night.  Patient denies injury or trauma to hand.  Patient went to emergency room a couple days ago and was told she had possible carpal tunnel.  Was told to follow-up with orthopedics.

## 2023-05-15 NOTE — PHYSICAL EXAM
[Right] : right hand [Dorsal Wrist] : dorsal wrist [Volar Wrist] : volar wrist [] : no instability w/varus/valgus or ant/post stressing of fingers joints [de-identified] : Good strength throughout

## 2023-05-22 ENCOUNTER — LAB (OUTPATIENT)
Dept: LAB | Facility: LAB | Age: 38
End: 2023-05-22
Payer: COMMERCIAL

## 2023-05-22 DIAGNOSIS — R63.5 WEIGHT GAIN: ICD-10-CM

## 2023-05-22 DIAGNOSIS — L68.9 EXCESSIVE HAIR GROWTH: ICD-10-CM

## 2023-05-22 DIAGNOSIS — R53.83 OTHER FATIGUE: ICD-10-CM

## 2023-05-22 LAB
ALANINE AMINOTRANSFERASE (SGPT) (U/L) IN SER/PLAS: 22 U/L (ref 7–45)
ALBUMIN (G/DL) IN SER/PLAS: 4.1 G/DL (ref 3.4–5)
ALKALINE PHOSPHATASE (U/L) IN SER/PLAS: 42 U/L (ref 33–110)
ANION GAP IN SER/PLAS: 10 MMOL/L (ref 10–20)
ASPARTATE AMINOTRANSFERASE (SGOT) (U/L) IN SER/PLAS: 20 U/L (ref 9–39)
BILIRUBIN TOTAL (MG/DL) IN SER/PLAS: 0.3 MG/DL (ref 0–1.2)
CALCIDIOL (25 OH VITAMIN D3) (NG/ML) IN SER/PLAS: 21 NG/ML
CALCIUM (MG/DL) IN SER/PLAS: 9.5 MG/DL (ref 8.6–10.3)
CARBON DIOXIDE, TOTAL (MMOL/L) IN SER/PLAS: 24 MMOL/L (ref 21–32)
CHLORIDE (MMOL/L) IN SER/PLAS: 108 MMOL/L (ref 98–107)
CHOLESTEROL (MG/DL) IN SER/PLAS: 162 MG/DL (ref 0–199)
CHOLESTEROL IN HDL (MG/DL) IN SER/PLAS: 37.3 MG/DL
CHOLESTEROL/HDL RATIO: 4.3
COBALAMIN (VITAMIN B12) (PG/ML) IN SER/PLAS: 226 PG/ML (ref 211–911)
CREATININE (MG/DL) IN SER/PLAS: 0.84 MG/DL (ref 0.5–1.05)
GFR FEMALE: >90 ML/MIN/1.73M2
GLUCOSE (MG/DL) IN SER/PLAS: 95 MG/DL (ref 74–99)
INSULIN, FASTING: 14 UIU/ML (ref 3–25)
LDL: 105 MG/DL (ref 0–99)
POTASSIUM (MMOL/L) IN SER/PLAS: 4.2 MMOL/L (ref 3.5–5.3)
PROTEIN TOTAL: 6.9 G/DL (ref 6.4–8.2)
SODIUM (MMOL/L) IN SER/PLAS: 138 MMOL/L (ref 136–145)
THYROTROPIN (MIU/L) IN SER/PLAS BY DETECTION LIMIT <= 0.05 MIU/L: 1.67 MIU/L (ref 0.44–3.98)
TRIGLYCERIDE (MG/DL) IN SER/PLAS: 101 MG/DL (ref 0–149)
UREA NITROGEN (MG/DL) IN SER/PLAS: 14 MG/DL (ref 6–23)
VLDL: 20 MG/DL (ref 0–40)

## 2023-05-22 PROCEDURE — 84402 ASSAY OF FREE TESTOSTERONE: CPT

## 2023-05-22 PROCEDURE — 82306 VITAMIN D 25 HYDROXY: CPT

## 2023-05-22 PROCEDURE — 83001 ASSAY OF GONADOTROPIN (FSH): CPT

## 2023-05-22 PROCEDURE — 84443 ASSAY THYROID STIM HORMONE: CPT

## 2023-05-22 PROCEDURE — 83525 ASSAY OF INSULIN: CPT

## 2023-05-22 PROCEDURE — 82607 VITAMIN B-12: CPT

## 2023-05-22 PROCEDURE — 80053 COMPREHEN METABOLIC PANEL: CPT

## 2023-05-22 PROCEDURE — 36415 COLL VENOUS BLD VENIPUNCTURE: CPT

## 2023-05-22 PROCEDURE — 84146 ASSAY OF PROLACTIN: CPT

## 2023-05-22 PROCEDURE — 84403 ASSAY OF TOTAL TESTOSTERONE: CPT

## 2023-05-22 PROCEDURE — 83002 ASSAY OF GONADOTROPIN (LH): CPT

## 2023-05-22 PROCEDURE — 80061 LIPID PANEL: CPT

## 2023-05-22 PROCEDURE — 83498 ASY HYDROXYPROGESTERONE 17-D: CPT

## 2023-05-23 LAB
FOLLITROPIN (IU/L) IN SER/PLAS: 2.9 IU/L
LUTEINIZING HORMONE (IU/ML) IN SER/PLAS: 2.6 IU/L
PROLACTIN (UG/L) IN SER/PLAS: 9.8 UG/L (ref 3–20)

## 2023-05-24 ENCOUNTER — TELEPHONE (OUTPATIENT)
Dept: PRIMARY CARE | Facility: CLINIC | Age: 38
End: 2023-05-24
Payer: COMMERCIAL

## 2023-05-24 NOTE — TELEPHONE ENCOUNTER
Please notify pt that some of her lab results are back. Her Vit D and B 12 are both on the low end. I would recc she start with Vit D 3 2,000 units daily and a Vit B 12 supplement. [employer] is a good brand that is readily available at [facility] and most local stores.      Chiquita Gerber, DO

## 2023-05-25 LAB — 17-HYDROXYPROGESTERONE (REFLAB): 151.34 NG/DL

## 2023-05-29 LAB
TESTOSTERONE FREE (CHAN): 5.2 PG/ML (ref 0.1–6.4)
TESTOSTERONE,TOTAL,LC-MS/MS: 30 NG/DL (ref 2–45)

## 2023-06-02 ENCOUNTER — APPOINTMENT (OUTPATIENT)
Dept: PAIN MANAGEMENT | Facility: CLINIC | Age: 38
End: 2023-06-02
Payer: MEDICAID

## 2023-06-02 DIAGNOSIS — G56.01 CARPAL TUNNEL SYNDROME, RIGHT UPPER LIMB: ICD-10-CM

## 2023-06-02 PROCEDURE — 99213 OFFICE O/P EST LOW 20 MIN: CPT

## 2023-06-02 NOTE — PHYSICAL EXAM
[de-identified] : GENERAL APPEARANCE OF PATIENT IS WELL DEVELOPED, WELL NOURISHED, BODY HABITUS NORMAL, WELL GROOMED, NO DEFORMITIES NOTED. \par Head - Atraumatic and Normocephalic \par Eyes, Nose, and Throat: External inspection of ears and nose are normal overall without scars, lesions, or masses noted. Assessment of hearing is normal\par Neck-Examination of neck shows no masses, overall appearance is normal, neck is symmetric, tracheal position is midline, no crepitus is noted. Examination of thyroid shows no enlargement, tenderness or masses\par Respiratory- Assessment of respiratory effort shows no intercostal retractions, no use of accessory muscles, unlabored breathing, and normal diaphragmatic movement.\par Cardiovascular- Examination of extremities show no edema or varicosities\par Musculoskeletal. Examination of gait is not antalgic and station is normal\par Inspection and palpation of digits and nails shows no clubbing, cyanosis, nodules, drainage, fluctuance, petechiae\par \par • Spine – inspection and palpation shows no misalignment, asymmetry, crepitation, defects, tenderness, masses, effusions. ROM is normal without crepitation or contracture. No instability or subluxation or laxity is noted. No abnormal movements.\par \par \par • Neck- inspection and palpation shows no misalignment, asymmetry, crepitation, defects, tenderness, masses, effusions. ROM is normal without crepitation or contracture. No instability or subluxation or laxity is noted. No abnormal movements.\par \par \par • RUE- inspection and palpation shows no misalignment, asymmetry, crepitation, defects, tenderness, masses, effusions. ROM is normal without crepitation or contracture. No instability or subluxation or laxity is noted. No abnormal movements.\par \par \par • LUE- inspection and palpation shows no misalignment, asymmetry, crepitation, defects, tenderness, masses, effusions. ROM is normal without crepitation or contracture. No instability or subluxation or laxity is noted. No abnormal movements.\par \par \par • RLE- inspection and palpation shows no misalignment, asymmetry, crepitation, defects, tenderness, masses, effusions. ROM is normal without crepitation or contracture. No instability or subluxation or laxity is noted. No abnormal movements.\par \par \par • LLE- allodynia and hyperalgesia 2/3 down the left leg. Multiple incisions on the left foot and ankle. Incisions are healing well no signs of infection. Swelling on the left foot. \par \par \par • Skin- Inspection of skin and subcutaneous tissue shows no rashes, lesions or ulcers. Palpation of skin and subcutaneous tissue shows no rashes, no indurations, subcutaneous nodules or tightening.\par \par \par • Abdomen- Nontender\par \par \par • Neurologic- CN 2-12 are grossly intact. No sensory or motor deficits in the upper and lower extremities. Adequate strength in upper and lower extremities \par \par \par • Psychiatric- Patient’s judgment and insight are intact. Oriented to time, place and person. Recent and remote memory intact.\par
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
## 2023-06-05 ENCOUNTER — OFFICE VISIT (OUTPATIENT)
Dept: PRIMARY CARE | Facility: CLINIC | Age: 38
End: 2023-06-05
Payer: COMMERCIAL

## 2023-06-05 VITALS — WEIGHT: 194 LBS | SYSTOLIC BLOOD PRESSURE: 120 MMHG | DIASTOLIC BLOOD PRESSURE: 80 MMHG

## 2023-06-05 DIAGNOSIS — R43.0 ANOSMIA: ICD-10-CM

## 2023-06-05 DIAGNOSIS — E55.9 VITAMIN D DEFICIENCY: ICD-10-CM

## 2023-06-05 DIAGNOSIS — Z71.6 ENCOUNTER FOR SMOKING CESSATION COUNSELING: ICD-10-CM

## 2023-06-05 DIAGNOSIS — F17.200 TOBACCO DEPENDENCE: Primary | ICD-10-CM

## 2023-06-05 DIAGNOSIS — E53.8 VITAMIN B 12 DEFICIENCY: ICD-10-CM

## 2023-06-05 PROCEDURE — 99214 OFFICE O/P EST MOD 30 MIN: CPT | Performed by: FAMILY MEDICINE

## 2023-06-05 RX ORDER — VARENICLINE TARTRATE 1 MG/1
TABLET, FILM COATED ORAL
Qty: 60 TABLET | Refills: 2 | Status: SHIPPED | OUTPATIENT
Start: 2023-06-05

## 2023-06-05 ASSESSMENT — ENCOUNTER SYMPTOMS
PALPITATIONS: 0
PSYCHIATRIC NEGATIVE: 1
CARDIOVASCULAR NEGATIVE: 1
FATIGUE: 1

## 2023-06-05 NOTE — PROGRESS NOTES
Subjective   Patient ID: Shannan Tracey is a 37 y.o. female who presents for Follow-up (Wants to talk about her blood work ).    Pt presents for follow up    She wishes to review recent lab results   She has started Vit B 12 and Vit D 3 daily    She has reduced dietary caffeine intake and has noticed her palpitations have resolved     Smoking cessation  Is smoking a pack a day, since age 13   Has been on Chantix in the past,wishes to re start   She did have vivid dreams on Chantix in the past and is aware this could happen again    She has a habit changer cameron that is supportive   Will start acupuncture as well          Review of Systems   Constitutional:  Positive for fatigue.   Cardiovascular: Negative.  Negative for palpitations.   Psychiatric/Behavioral: Negative.         Objective   /80   Wt 88 kg (194 lb)   LMP 05/09/2023 (Approximate)     Physical Exam  Constitutional:       Appearance: Normal appearance.   Cardiovascular:      Rate and Rhythm: Normal rate and regular rhythm.      Heart sounds: Normal heart sounds.   Pulmonary:      Effort: Pulmonary effort is normal.      Breath sounds: Normal breath sounds.   Neurological:      General: No focal deficit present.      Mental Status: She is alert. Mental status is at baseline.   Psychiatric:         Mood and Affect: Mood normal.         Behavior: Behavior normal.         Thought Content: Thought content normal.         Judgment: Judgment normal.         Assessment/Plan   Diagnoses and all orders for this visit:  Tobacco dependence  -     varenicline (Chantix) 1 mg tablet; 0.5 mg po every day x 3 days, then 0.5 mg po BID x 4 days, the  1 mg po BID  Vitamin D deficiency  Vitamin B 12 deficiency  Anosmia  Encounter for smoking cessation counseling    Reviewed all recent lab results      Offered option of ENT referral, pt prefers to wait at this time    Discussed the R/B/SE of chantix, discussed need for quit date and duration of therapy  Discussed smoking  cessation, pt has an cameron, as well as an acupuncture appt.     Follow up appt in 6 weeks  Advised pt to call sooner with any questions or concerns   Chiquita Gerber, DO

## 2023-06-12 ENCOUNTER — APPOINTMENT (OUTPATIENT)
Dept: ORTHOPEDIC SURGERY | Facility: CLINIC | Age: 38
End: 2023-06-12

## 2023-06-27 ENCOUNTER — TELEPHONE (OUTPATIENT)
Dept: PRIMARY CARE | Facility: CLINIC | Age: 38
End: 2023-06-27
Payer: COMMERCIAL

## 2023-06-27 RX ORDER — NALOXONE HYDROCHLORIDE 4 MG/.1ML
4 SPRAY NASAL
Qty: 1 | Refills: 0 | Status: ACTIVE | COMMUNITY
Start: 2023-06-27 | End: 1900-01-01

## 2023-06-27 NOTE — TELEPHONE ENCOUNTER
I spoke with pt stating that Linda was out of the office and she could let her know the status when she returns

## 2023-07-10 ENCOUNTER — APPOINTMENT (OUTPATIENT)
Dept: PAIN MANAGEMENT | Facility: CLINIC | Age: 38
End: 2023-07-10
Payer: MEDICAID

## 2023-07-10 VITALS
HEIGHT: 66 IN | BODY MASS INDEX: 28.93 KG/M2 | SYSTOLIC BLOOD PRESSURE: 151 MMHG | DIASTOLIC BLOOD PRESSURE: 96 MMHG | HEART RATE: 77 BPM | WEIGHT: 180 LBS

## 2023-07-10 PROCEDURE — 99213 OFFICE O/P EST LOW 20 MIN: CPT
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
## 2023-07-11 ENCOUNTER — APPOINTMENT (OUTPATIENT)
Dept: ORTHOPEDIC SURGERY | Facility: CLINIC | Age: 38
End: 2023-07-11

## 2023-08-04 ENCOUNTER — APPOINTMENT (OUTPATIENT)
Dept: PAIN MANAGEMENT | Facility: CLINIC | Age: 38
End: 2023-08-04
Payer: MEDICAID

## 2023-08-04 VITALS
HEIGHT: 66 IN | BODY MASS INDEX: 28.93 KG/M2 | HEART RATE: 94 BPM | SYSTOLIC BLOOD PRESSURE: 139 MMHG | DIASTOLIC BLOOD PRESSURE: 96 MMHG | WEIGHT: 180 LBS

## 2023-08-04 PROCEDURE — 99213 OFFICE O/P EST LOW 20 MIN: CPT

## 2023-08-04 NOTE — PHYSICAL EXAM
[de-identified] : GENERAL APPEARANCE OF PATIENT IS WELL DEVELOPED, WELL NOURISHED, BODY HABITUS NORMAL, WELL GROOMED, NO DEFORMITIES NOTED. \par  Head - Atraumatic and Normocephalic \par  Eyes, Nose, and Throat: External inspection of ears and nose are normal overall without scars, lesions, or masses noted. Assessment of hearing is normal\par  Neck-Examination of neck shows no masses, overall appearance is normal, neck is symmetric, tracheal position is midline, no crepitus is noted. Examination of thyroid shows no enlargement, tenderness or masses\par  Respiratory- Assessment of respiratory effort shows no intercostal retractions, no use of accessory muscles, unlabored breathing, and normal diaphragmatic movement.\par  Cardiovascular- Examination of extremities show no edema or varicosities\par  Musculoskeletal. Examination of gait is not antalgic and station is normal\par  Inspection and palpation of digits and nails shows no clubbing, cyanosis, nodules, drainage, fluctuance, petechiae\par  \par  - Spine - inspection and palpation shows no misalignment, asymmetry, crepitation, defects, tenderness, masses, effusions. ROM is normal without crepitation or contracture. No instability or subluxation or laxity is noted. No abnormal movements.\par  \par  \par  - Neck- inspection and palpation shows no misalignment, asymmetry, crepitation, defects, tenderness, masses, effusions. ROM is normal without crepitation or contracture. No instability or subluxation or laxity is noted. No abnormal movements.\par  \par  \par  - RUE- inspection and palpation shows no misalignment, asymmetry, crepitation, defects, tenderness, masses, effusions. ROM is normal without crepitation or contracture. No instability or subluxation or laxity is noted. No abnormal movements.\par  \par  \par  - LUE- inspection and palpation shows no misalignment, asymmetry, crepitation, defects, tenderness, masses, effusions. ROM is normal without crepitation or contracture. No instability or subluxation or laxity is noted. No abnormal movements.\par  \par  \par  - RLE- inspection and palpation shows no misalignment, asymmetry, crepitation, defects, tenderness, masses, effusions. ROM is normal without crepitation or contracture. No instability or subluxation or laxity is noted. No abnormal movements.\par  \par  \par  - LLE- allodynia and hyperalgesia 2/3 down the left leg. Multiple incisions on the left foot and ankle. Incisions are healing well no signs of infection. Swelling on the left foot. \par  \par  \par  - Skin- Inspection of skin and subcutaneous tissue shows no rashes, lesions or ulcers. Palpation of skin and subcutaneous tissue shows no rashes, no indurations, subcutaneous nodules or tightening.\par  \par  \par  - Abdomen- Nontender\par  \par  \par  - Neurologic- CN 2-12 are grossly intact. No sensory or motor deficits in the upper and lower extremities. Adequate strength in upper and lower extremities \par  \par  \par  - Psychiatric- Patient's judgment and insight are intact. Oriented to time, place and person. Recent and remote memory intact.\par

## 2023-08-04 NOTE — PHYSICAL EXAM
[de-identified] : GENERAL APPEARANCE OF PATIENT IS WELL DEVELOPED, WELL NOURISHED, BODY HABITUS NORMAL, WELL GROOMED, NO DEFORMITIES NOTED. \par  Head - Atraumatic and Normocephalic \par  Eyes, Nose, and Throat: External inspection of ears and nose are normal overall without scars, lesions, or masses noted. Assessment of hearing is normal\par  Neck-Examination of neck shows no masses, overall appearance is normal, neck is symmetric, tracheal position is midline, no crepitus is noted. Examination of thyroid shows no enlargement, tenderness or masses\par  Respiratory- Assessment of respiratory effort shows no intercostal retractions, no use of accessory muscles, unlabored breathing, and normal diaphragmatic movement.\par  Cardiovascular- Examination of extremities show no edema or varicosities\par  Musculoskeletal. Examination of gait is not antalgic and station is normal\par  Inspection and palpation of digits and nails shows no clubbing, cyanosis, nodules, drainage, fluctuance, petechiae\par  \par  - Spine - inspection and palpation shows no misalignment, asymmetry, crepitation, defects, tenderness, masses, effusions. ROM is normal without crepitation or contracture. No instability or subluxation or laxity is noted. No abnormal movements.\par  \par  \par  - Neck- inspection and palpation shows no misalignment, asymmetry, crepitation, defects, tenderness, masses, effusions. ROM is normal without crepitation or contracture. No instability or subluxation or laxity is noted. No abnormal movements.\par  \par  \par  - RUE- inspection and palpation shows no misalignment, asymmetry, crepitation, defects, tenderness, masses, effusions. ROM is normal without crepitation or contracture. No instability or subluxation or laxity is noted. No abnormal movements.\par  \par  \par  - LUE- inspection and palpation shows no misalignment, asymmetry, crepitation, defects, tenderness, masses, effusions. ROM is normal without crepitation or contracture. No instability or subluxation or laxity is noted. No abnormal movements.\par  \par  \par  - RLE- inspection and palpation shows no misalignment, asymmetry, crepitation, defects, tenderness, masses, effusions. ROM is normal without crepitation or contracture. No instability or subluxation or laxity is noted. No abnormal movements.\par  \par  \par  - LLE- allodynia and hyperalgesia 2/3 down the left leg. Multiple incisions on the left foot and ankle. Incisions are healing well no signs of infection. Swelling on the left foot. \par  \par  \par  - Skin- Inspection of skin and subcutaneous tissue shows no rashes, lesions or ulcers. Palpation of skin and subcutaneous tissue shows no rashes, no indurations, subcutaneous nodules or tightening.\par  \par  \par  - Abdomen- Nontender\par  \par  \par  - Neurologic- CN 2-12 are grossly intact. No sensory or motor deficits in the upper and lower extremities. Adequate strength in upper and lower extremities \par  \par  \par  - Psychiatric- Patient's judgment and insight are intact. Oriented to time, place and person. Recent and remote memory intact.\par

## 2023-08-04 NOTE — DISCUSSION/SUMMARY
[Medication Risks Reviewed] : Medication risks reviewed [de-identified] : Ms. Perez is a 37-year-old female with a chief complaint of left leg pain.  She has had this pain following an injury in which she fractured her ankle in 2 places. Patient states that pain is made worse by a left ankle surgery which took place on 07/13/2022. She notes that the use of medication provides her with at least 80% relief throughout the day and allow her to perform ADLs.  The patient denies any adverse side effects due to the medication (sleeping disturbance, constipation, sleepiness, hallucinations and/or urination problems). \par  \par  Patient continues to have pain in the left ankle and foot. She has physical findings of hyperalgesia and allodynia. Patient has history and physical findings that support a sympathetic pain syndrome in the left lower extremity. Patient has physical findings of allodynia, hyperalgesia decreased range of motion and pseudo-motor changes. Patient is s/p Sympathetic Nerve block on 01/19/2023 with only 25% relief,so we will hold off on the next injection at this time.\par  \par  UDS from 04/03/23 is consistent.\par  \par  In the interim, it is medically necessary for the patient to utilize a left foot and ankle boot to minimize impact on her injured foot and increase blood circulation in the left lower extremity. \par  \par   All of this patient's questions were answered and she understood our conversation well. \par  \par  \par  Raman Chi PA-C\par  Epi Dangelo D.O. \par

## 2023-08-04 NOTE — DISCUSSION/SUMMARY
[Medication Risks Reviewed] : Medication risks reviewed [de-identified] : Ms. Perez is a 37-year-old female with a chief complaint of left leg pain.  She has had this pain following an injury in which she fractured her ankle in 2 places. Patient states that pain is made worse by a left ankle surgery which took place on 07/13/2022. She notes that the use of medication provides her with at least 80% relief throughout the day and allow her to perform ADLs.  The patient denies any adverse side effects due to the medication (sleeping disturbance, constipation, sleepiness, hallucinations and/or urination problems). \par  \par  Patient continues to have pain in the left ankle and foot. She has physical findings of hyperalgesia and allodynia. Patient has history and physical findings that support a sympathetic pain syndrome in the left lower extremity. Patient has physical findings of allodynia, hyperalgesia decreased range of motion and pseudo-motor changes. Patient is s/p Sympathetic Nerve block on 01/19/2023 with only 25% relief,so we will hold off on the next injection at this time.\par  \par  UDS from 04/03/23 is consistent.\par  \par  In the interim, it is medically necessary for the patient to utilize a left foot and ankle boot to minimize impact on her injured foot and increase blood circulation in the left lower extremity. \par  \par   All of this patient's questions were answered and she understood our conversation well. \par  \par  \par  Raman Chi PA-C\par  Epi Dangelo D.O. \par
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
## 2023-09-01 ENCOUNTER — APPOINTMENT (OUTPATIENT)
Dept: PAIN MANAGEMENT | Facility: CLINIC | Age: 38
End: 2023-09-01
Payer: MEDICAID

## 2023-09-01 DIAGNOSIS — Z79.891 LONG TERM (CURRENT) USE OF OPIATE ANALGESIC: ICD-10-CM

## 2023-09-01 DIAGNOSIS — G90.522 COMPLEX REGIONAL PAIN SYNDROME I OF LEFT LOWER LIMB: ICD-10-CM

## 2023-09-01 DIAGNOSIS — G89.29 OTHER CHRONIC PAIN: ICD-10-CM

## 2023-09-01 DIAGNOSIS — M25.572 PAIN IN LEFT ANKLE AND JOINTS OF LEFT FOOT: ICD-10-CM

## 2023-09-01 PROCEDURE — 99214 OFFICE O/P EST MOD 30 MIN: CPT

## 2023-09-01 RX ORDER — NAPROXEN 500 MG/1
500 TABLET ORAL
Qty: 60 | Refills: 1 | Status: ACTIVE | COMMUNITY
Start: 2023-05-15 | End: 1900-01-01

## 2023-09-01 RX ORDER — OXYCODONE AND ACETAMINOPHEN 10; 325 MG/1; MG/1
10-325 TABLET ORAL TWICE DAILY
Qty: 60 | Refills: 0 | Status: ACTIVE | COMMUNITY
Start: 2022-09-27 | End: 1900-01-01

## 2023-09-01 NOTE — DISCUSSION/SUMMARY
[Medication Risks Reviewed] : Medication risks reviewed [de-identified] : Ms. Perez is a 37-year-old female with a chief complaint of left leg pain.  She has had this pain following an injury in which she fractured her ankle in 2 places. Patient states that pain is made worse by a left ankle surgery which took place on 07/13/2022. She underwent revision surgery for removal of hardware on 08/18/2023. She notes that the use of medication provides her with at least 80% relief throughout the day and allow her to perform ADLs.  The patient denies any adverse side effects due to the medication (sleeping disturbance, constipation, sleepiness, hallucinations and/or urination problems).   Patient continues to have pain in the left ankle and foot. She has physical findings of hyperalgesia and allodynia. Patient has history and physical findings that support a sympathetic pain syndrome in the left lower extremity. Patient has physical findings of allodynia, hyperalgesia decreased range of motion and pseudo-motor changes. Patient is s/p Sympathetic Nerve block on 01/19/2023 with only 25% relief,so we will hold off on the next injection at this time.  UDS from 04/03/23 is consistent. Will repeat today.   All of this patient's questions were answered and she understood our conversation well.    CHAUNCEY [place] D.O.

## 2023-09-01 NOTE — PHYSICAL EXAM
[de-identified] : GENERAL APPEARANCE OF PATIENT IS WELL DEVELOPED, WELL NOURISHED, BODY HABITUS NORMAL, WELL GROOMED, NO DEFORMITIES NOTED. \par  Head - Atraumatic and Normocephalic \par  Eyes, Nose, and Throat: External inspection of ears and nose are normal overall without scars, lesions, or masses noted. Assessment of hearing is normal\par  Neck-Examination of neck shows no masses, overall appearance is normal, neck is symmetric, tracheal position is midline, no crepitus is noted. Examination of thyroid shows no enlargement, tenderness or masses\par  Respiratory- Assessment of respiratory effort shows no intercostal retractions, no use of accessory muscles, unlabored breathing, and normal diaphragmatic movement.\par  Cardiovascular- Examination of extremities show no edema or varicosities\par  Musculoskeletal. Examination of gait is not antalgic and station is normal\par  Inspection and palpation of digits and nails shows no clubbing, cyanosis, nodules, drainage, fluctuance, petechiae\par  \par  - Spine - inspection and palpation shows no misalignment, asymmetry, crepitation, defects, tenderness, masses, effusions. ROM is normal without crepitation or contracture. No instability or subluxation or laxity is noted. No abnormal movements.\par  \par  \par  - Neck- inspection and palpation shows no misalignment, asymmetry, crepitation, defects, tenderness, masses, effusions. ROM is normal without crepitation or contracture. No instability or subluxation or laxity is noted. No abnormal movements.\par  \par  \par  - RUE- inspection and palpation shows no misalignment, asymmetry, crepitation, defects, tenderness, masses, effusions. ROM is normal without crepitation or contracture. No instability or subluxation or laxity is noted. No abnormal movements.\par  \par  \par  - LUE- inspection and palpation shows no misalignment, asymmetry, crepitation, defects, tenderness, masses, effusions. ROM is normal without crepitation or contracture. No instability or subluxation or laxity is noted. No abnormal movements.\par  \par  \par  - RLE- inspection and palpation shows no misalignment, asymmetry, crepitation, defects, tenderness, masses, effusions. ROM is normal without crepitation or contracture. No instability or subluxation or laxity is noted. No abnormal movements.\par  \par  \par  - LLE- allodynia and hyperalgesia 2/3 down the left leg. Multiple incisions on the left foot and ankle. Incisions are healing well no signs of infection. Swelling on the left foot. \par  \par  \par  - Skin- Inspection of skin and subcutaneous tissue shows no rashes, lesions or ulcers. Palpation of skin and subcutaneous tissue shows no rashes, no indurations, subcutaneous nodules or tightening.\par  \par  \par  - Abdomen- Nontender\par  \par  \par  - Neurologic- CN 2-12 are grossly intact. No sensory or motor deficits in the upper and lower extremities. Adequate strength in upper and lower extremities \par  \par  \par  - Psychiatric- Patient's judgment and insight are intact. Oriented to time, place and person. Recent and remote memory intact.\par
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
## 2023-09-29 ENCOUNTER — APPOINTMENT (OUTPATIENT)
Dept: PAIN MANAGEMENT | Facility: CLINIC | Age: 38
End: 2023-09-29

## 2023-10-10 ENCOUNTER — EMERGENCY (EMERGENCY)
Facility: HOSPITAL | Age: 38
LOS: 0 days | Discharge: HOME | End: 2023-10-10
Attending: EMERGENCY MEDICINE
Payer: MEDICAID

## 2023-10-10 VITALS
SYSTOLIC BLOOD PRESSURE: 140 MMHG | HEART RATE: 98 BPM | DIASTOLIC BLOOD PRESSURE: 73 MMHG | WEIGHT: 164.91 LBS | TEMPERATURE: 98 F | OXYGEN SATURATION: 99 % | RESPIRATION RATE: 18 BRPM

## 2023-10-10 VITALS
HEART RATE: 81 BPM | SYSTOLIC BLOOD PRESSURE: 132 MMHG | DIASTOLIC BLOOD PRESSURE: 90 MMHG | TEMPERATURE: 98 F | RESPIRATION RATE: 18 BRPM | OXYGEN SATURATION: 100 %

## 2023-10-10 DIAGNOSIS — M25.572 PAIN IN LEFT ANKLE AND JOINTS OF LEFT FOOT: ICD-10-CM

## 2023-10-10 DIAGNOSIS — M79.89 OTHER SPECIFIED SOFT TISSUE DISORDERS: ICD-10-CM

## 2023-10-10 DIAGNOSIS — S91.012D LACERATION WITHOUT FOREIGN BODY, LEFT ANKLE, SUBSEQUENT ENCOUNTER: ICD-10-CM

## 2023-10-10 PROCEDURE — 99283 EMERGENCY DEPT VISIT LOW MDM: CPT

## 2023-10-10 RX ORDER — OXYCODONE AND ACETAMINOPHEN 5; 325 MG/1; MG/1
1 TABLET ORAL ONCE
Refills: 0 | Status: DISCONTINUED | OUTPATIENT
Start: 2023-10-10 | End: 2023-10-10

## 2023-10-10 RX ADMIN — OXYCODONE AND ACETAMINOPHEN 1 TABLET(S): 5; 325 TABLET ORAL at 17:21

## 2023-10-10 NOTE — ED PROVIDER NOTE - PHYSICAL EXAMINATION
Gen: NAD, AOx3  Head: NCAT  HEENT: PERRL, oral mucosa moist, normal conjunctiva, oropharynx clear without exudate or erythema  Lung: CTAB, no respiratory distress, no wheezing, rales, rhonchi  CV: normal s1/s2, rrr, Normal perfusion, pulses 2+ throughout  MSK: no visible deformities, full range of motion in all 4 extremities  Neuro: No focal neurologic deficits  Skin: No rash, surgical wound to L medical malleolus clean/dry/intact with one suture in place    Psych: normal affect
06-Feb-2017 12:41

## 2023-10-10 NOTE — ED PROVIDER NOTE - OBJECTIVE STATEMENT
36 yo female with no pertinent pmh presents for suture removal from L ankle. pt states she had surgery to her L ankle [facility] in August and today her physical therapist noted a suture was not removed. pt denies any other symptoms including fevers, chill, headache, recent illness/travel, cough, abdominal pain, chest pain, or SOB.

## 2023-10-10 NOTE — ED ADULT TRIAGE NOTE - CHIEF COMPLAINT QUOTE
pt here for removal of stitches to her left ankle. pt said she had the stitches removed but there are still some in there

## 2023-10-10 NOTE — ED PROVIDER NOTE - CLINICAL SUMMARY MEDICAL DECISION MAKING FREE TEXT BOX
37-year-old female s/p L ankle surgery for hardware removal on 8/17/2023 at [facility], noted to have retained suture while at PT today > in ER requesting removal.  Patient complaining of persistent pain and swelling to ankle.  No redness, no wound drainage, no fever.  PE - LLE: + Ankle swelling, no bony tenderness, new erythema/warmth, surgical incision c/d/i with 1 suture in place.    -Suture removed in ER, patient instructed on continued wound care at home.  Patient requesting pain medication for her chronic pain.  To continue with PT as outpatient, told to return to ER if she feels worse, or for any new/concerning symptoms.  Patient understands and agrees with plan.

## 2023-10-10 NOTE — ED PROVIDER NOTE - NSFOLLOWUPCLINICS_GEN_ALL_ED_FT
[facility]  Orthpedic  [address], [place]   Phone: [phone]  Fax:   Follow Up Time: Routine

## 2023-10-10 NOTE — ED PROVIDER NOTE - NSFOLLOWUPINSTRUCTIONS_ED_ALL_ED_FT
Please follow up with your primary care physician within 24-72 hours and return immediately if symptoms worsen.    Stitches Removal    WHAT YOU NEED TO KNOW:    Stitches are usually removed within 14 days, depending on the location of the wound. Your healthcare provider will tell you when to return to have your stitches removed. Your provider will use sterile forceps or tweezers to  the knot of each stitch. He or she will cut the stitch with scissors and pull the stitch out. You may feel a slight tug as the stitch comes out.    DISCHARGE INSTRUCTIONS:    Return to the emergency department if:     Your wound splits open or is starting to come apart.      You suddenly cannot move your injured joint.      You have sudden numbness around your wound.      You see red streaks coming from your wound.    Contact your healthcare provider if:     You have a fever and chills.      Your wound is red, warm, swollen, or leaking pus.      There is a bad smell coming from your wound.      You have increased pain in the wound area.      You have questions or concerns about your condition or care.    Care for the area after the stitches are removed:     Do not pull medical tape off. Your provider may place small strips of medical tape across your wound after the stitches have been removed. These strips will peel and fall of on their own. Do not pull them off.      Clean the area as directed. Carefully wash the area with soap and water. Pat the area dry with a clean towel. Check the area for signs of infection, such as redness, swelling, or pus. Also check that the wound is not coming apart.      Protect your wound. Your wound can swell, bleed, or split open if it is stretched or bumped. You may need to wear a bandage that supports your wound until it is completely healed.      Care for a scar. You may have a scar after the stitches are removed. Use sunblock if the area is exposed to the sun. Apply it every day after the stitches are removed. This will help prevent skin discoloration. Talk to your healthcare provider about medicines you can use to make the scar less visible. Some medicines are available without a prescription.    Follow up with your healthcare provider as directed: You may need to return in 3 to 5 days if the stitches are on your face. Stitches on your scalp need to be removed after 7 to 14 days. Stitches over joints may remain in place up to 14 days. Write down your questions so you remember to ask them during your visits.

## 2023-10-13 ENCOUNTER — OFFICE VISIT (OUTPATIENT)
Dept: FAMILY MEDICINE CLINIC | Age: 38
End: 2023-10-13
Payer: COMMERCIAL

## 2023-10-13 VITALS
TEMPERATURE: 97.6 F | OXYGEN SATURATION: 97 % | SYSTOLIC BLOOD PRESSURE: 116 MMHG | WEIGHT: 209 LBS | DIASTOLIC BLOOD PRESSURE: 70 MMHG | BODY MASS INDEX: 33.59 KG/M2 | HEART RATE: 64 BPM | HEIGHT: 66 IN

## 2023-10-13 DIAGNOSIS — G43.919 INTRACTABLE MIGRAINE WITHOUT STATUS MIGRAINOSUS, UNSPECIFIED MIGRAINE TYPE: Primary | ICD-10-CM

## 2023-10-13 DIAGNOSIS — M62.838 MUSCLE SPASMS OF NECK: ICD-10-CM

## 2023-10-13 DIAGNOSIS — R11.0 NAUSEA: ICD-10-CM

## 2023-10-13 LAB
Lab: NORMAL
PERFORMING INSTRUMENT: NORMAL
QC PASS/FAIL: NORMAL
SARS-COV-2, POC: NORMAL

## 2023-10-13 PROCEDURE — 96372 THER/PROPH/DIAG INJ SC/IM: CPT | Performed by: NURSE PRACTITIONER

## 2023-10-13 PROCEDURE — G8417 CALC BMI ABV UP PARAM F/U: HCPCS | Performed by: NURSE PRACTITIONER

## 2023-10-13 PROCEDURE — G8427 DOCREV CUR MEDS BY ELIG CLIN: HCPCS | Performed by: NURSE PRACTITIONER

## 2023-10-13 PROCEDURE — 87426 SARSCOV CORONAVIRUS AG IA: CPT | Performed by: NURSE PRACTITIONER

## 2023-10-13 PROCEDURE — 1036F TOBACCO NON-USER: CPT | Performed by: NURSE PRACTITIONER

## 2023-10-13 PROCEDURE — G8484 FLU IMMUNIZE NO ADMIN: HCPCS | Performed by: NURSE PRACTITIONER

## 2023-10-13 PROCEDURE — 99213 OFFICE O/P EST LOW 20 MIN: CPT | Performed by: NURSE PRACTITIONER

## 2023-10-13 RX ORDER — SUMATRIPTAN 50 MG/1
TABLET, FILM COATED ORAL
Qty: 9 TABLET | Refills: 0 | Status: SHIPPED | OUTPATIENT
Start: 2023-10-13

## 2023-10-13 RX ORDER — PREDNISONE 20 MG/1
40 TABLET ORAL DAILY
Qty: 10 TABLET | Refills: 0 | Status: SHIPPED | OUTPATIENT
Start: 2023-10-13 | End: 2023-10-18

## 2023-10-13 RX ORDER — TIZANIDINE 4 MG/1
4 TABLET ORAL NIGHTLY PRN
Qty: 10 TABLET | Refills: 0 | Status: SHIPPED | OUTPATIENT
Start: 2023-10-13

## 2023-10-13 RX ORDER — KETOROLAC TROMETHAMINE 30 MG/ML
60 INJECTION, SOLUTION INTRAMUSCULAR; INTRAVENOUS ONCE
Status: COMPLETED | OUTPATIENT
Start: 2023-10-13 | End: 2023-10-13

## 2023-10-13 RX ORDER — METOCLOPRAMIDE 10 MG/1
10 TABLET ORAL 2 TIMES DAILY
Qty: 4 TABLET | Refills: 0 | Status: SHIPPED | OUTPATIENT
Start: 2023-10-13 | End: 2023-10-15

## 2023-10-13 RX ORDER — KETOROLAC TROMETHAMINE 30 MG/ML
60 INJECTION, SOLUTION INTRAMUSCULAR; INTRAVENOUS ONCE
Status: DISCONTINUED | OUTPATIENT
Start: 2023-10-13 | End: 2023-10-13

## 2023-10-13 RX ADMIN — KETOROLAC TROMETHAMINE 60 MG: 30 INJECTION, SOLUTION INTRAMUSCULAR; INTRAVENOUS at 14:22

## 2023-10-13 SDOH — ECONOMIC STABILITY: INCOME INSECURITY: HOW HARD IS IT FOR YOU TO PAY FOR THE VERY BASICS LIKE FOOD, HOUSING, MEDICAL CARE, AND HEATING?: NOT HARD AT ALL

## 2023-10-13 SDOH — ECONOMIC STABILITY: HOUSING INSECURITY
IN THE LAST 12 MONTHS, WAS THERE A TIME WHEN YOU DID NOT HAVE A STEADY PLACE TO SLEEP OR SLEPT IN A SHELTER (INCLUDING NOW)?: NO

## 2023-10-13 SDOH — ECONOMIC STABILITY: FOOD INSECURITY: WITHIN THE PAST 12 MONTHS, YOU WORRIED THAT YOUR FOOD WOULD RUN OUT BEFORE YOU GOT MONEY TO BUY MORE.: NEVER TRUE

## 2023-10-13 SDOH — ECONOMIC STABILITY: FOOD INSECURITY: WITHIN THE PAST 12 MONTHS, THE FOOD YOU BOUGHT JUST DIDN'T LAST AND YOU DIDN'T HAVE MONEY TO GET MORE.: NEVER TRUE

## 2023-10-13 ASSESSMENT — VISUAL ACUITY: OU: 1

## 2023-10-13 ASSESSMENT — PATIENT HEALTH QUESTIONNAIRE - PHQ9
SUM OF ALL RESPONSES TO PHQ QUESTIONS 1-9: 0
1. LITTLE INTEREST OR PLEASURE IN DOING THINGS: 0
SUM OF ALL RESPONSES TO PHQ9 QUESTIONS 1 & 2: 0
2. FEELING DOWN, DEPRESSED OR HOPELESS: 0
SUM OF ALL RESPONSES TO PHQ QUESTIONS 1-9: 0

## 2023-10-13 ASSESSMENT — ENCOUNTER SYMPTOMS
CHEST TIGHTNESS: 0
SORE THROAT: 0
VOMITING: 0
RHINORRHEA: 0
ABDOMINAL PAIN: 0
NAUSEA: 1
COUGH: 0
SHORTNESS OF BREATH: 0
DIARRHEA: 0

## 2023-10-13 NOTE — PROGRESS NOTES
After obtaining consent, and per orders of Dr. Taniya Boggs NP, injection of toradol 60mg given in Right upper quad. gluteus by Aria Colvin MA. Patient instructed to remain in clinic for 20 minutes afterwards, and to report any adverse reaction to me immediately.

## 2023-10-19 ENCOUNTER — TELEPHONE (OUTPATIENT)
Dept: PRIMARY CARE CLINIC | Age: 38
End: 2023-10-19

## 2023-10-19 ENCOUNTER — OFFICE VISIT (OUTPATIENT)
Dept: PRIMARY CARE CLINIC | Age: 38
End: 2023-10-19
Payer: COMMERCIAL

## 2023-10-19 VITALS
HEART RATE: 73 BPM | RESPIRATION RATE: 18 BRPM | TEMPERATURE: 98.7 F | HEIGHT: 66 IN | SYSTOLIC BLOOD PRESSURE: 124 MMHG | BODY MASS INDEX: 34.36 KG/M2 | DIASTOLIC BLOOD PRESSURE: 70 MMHG | OXYGEN SATURATION: 98 % | WEIGHT: 213.8 LBS

## 2023-10-19 DIAGNOSIS — Z00.00 HEALTH CARE MAINTENANCE: ICD-10-CM

## 2023-10-19 DIAGNOSIS — G44.059 SHORT LASTING UNILATERAL NEURALGIFORM HEADACHE WITH CONJUNCTIVAL INJECTION AND TEARING (SUNCT): Primary | ICD-10-CM

## 2023-10-19 DIAGNOSIS — M47.812 CERVICAL SPONDYLOSIS: ICD-10-CM

## 2023-10-19 DIAGNOSIS — E55.9 VITAMIN D DEFICIENCY: ICD-10-CM

## 2023-10-19 PROCEDURE — G8427 DOCREV CUR MEDS BY ELIG CLIN: HCPCS | Performed by: INTERNAL MEDICINE

## 2023-10-19 PROCEDURE — 99204 OFFICE O/P NEW MOD 45 MIN: CPT | Performed by: INTERNAL MEDICINE

## 2023-10-19 PROCEDURE — G8417 CALC BMI ABV UP PARAM F/U: HCPCS | Performed by: INTERNAL MEDICINE

## 2023-10-19 PROCEDURE — 1036F TOBACCO NON-USER: CPT | Performed by: INTERNAL MEDICINE

## 2023-10-19 PROCEDURE — G8484 FLU IMMUNIZE NO ADMIN: HCPCS | Performed by: INTERNAL MEDICINE

## 2023-10-19 RX ORDER — ONDANSETRON 4 MG/1
4 TABLET, FILM COATED ORAL 3 TIMES DAILY PRN
Qty: 30 TABLET | Refills: 1 | Status: SHIPPED | OUTPATIENT
Start: 2023-10-19

## 2023-10-19 RX ORDER — HYDROCODONE BITARTRATE AND ACETAMINOPHEN 5; 325 MG/1; MG/1
TABLET ORAL
COMMUNITY
Start: 2023-10-18 | End: 2023-10-20

## 2023-10-19 RX ORDER — INDOMETHACIN 50 MG/1
50 CAPSULE ORAL 3 TIMES DAILY PRN
Qty: 60 CAPSULE | Refills: 3 | Status: SHIPPED | OUTPATIENT
Start: 2023-10-19

## 2023-10-19 ASSESSMENT — ENCOUNTER SYMPTOMS
SHORTNESS OF BREATH: 0
NAUSEA: 0
DIARRHEA: 0
WHEEZING: 0
VOMITING: 0
COUGH: 0
ABDOMINAL PAIN: 0

## 2023-10-19 NOTE — PROGRESS NOTES
Subjective:      Patient ID: Frazier Cockayne is a 44 y.o. female    New pt with headache x 2 weeks   HPI  Pt is new to provider. PMHx: headache   No fam hx colon cancer   Last pap: negative 4 years ago     Headache x 2 weeks. Gradual onset of constantly dull and throbbing pain in the back of the head and neck(both sides), rated 9/10, nonradiating. Worse with light and sound, relieved with dilaudid(in the ER) and pulling up the head. Assoc nausea, no vomiting. No tingling or numbness. Assoc eye tearing, neck stiffness, attests to feverishness and chills, cold feeling on the back of the head and hot in the face. No slurred speech or limb weakness. Quit smoking 65 days ago, no drugs or alcohol. No dizziness, no antecedent URTI or trauma. CT head and neck showed cervical spine degeneration at [facility]. Vit D low in 2023. History reviewed. No pertinent past medical history. History reviewed. No pertinent surgical history.   Social History     Socioeconomic History    Marital status: Single     Spouse name: Not on file    Number of children: Not on file    Years of education: Not on file    Highest education level: Not on file   Occupational History    Not on file   Tobacco Use    Smoking status: Former     Packs/day: 1     Types: Cigarettes     Quit date: 2023     Years since quittin.1     Passive exposure: Past    Smokeless tobacco: Never   Substance and Sexual Activity    Alcohol use: Yes     Comment: rarely    Drug use: Never    Sexual activity: Not on file   Other Topics Concern    Not on file   Social History Narrative    Not on file     Social Determinants of Health     Financial Resource Strain: Low Risk  (10/13/2023)    Overall Financial Resource Strain (CARDIA)     Difficulty of Paying Living Expenses: Not hard at all   Food Insecurity: No Food Insecurity (10/13/2023)    Hunger Vital Sign     Worried About Running Out of Food in the Last Year: Never true     [address] in the Last Year:

## 2023-10-19 NOTE — TELEPHONE ENCOUNTER
Pt called the pharmacy wont fill this medication   Indomethacin 50mg  Is there something else you can send in?   Drug [facility]

## 2023-10-25 ENCOUNTER — INITIAL CONSULT (OUTPATIENT)
Dept: PAIN MANAGEMENT | Age: 38
End: 2023-10-25
Payer: COMMERCIAL

## 2023-10-25 VITALS
BODY MASS INDEX: 36.37 KG/M2 | SYSTOLIC BLOOD PRESSURE: 122 MMHG | WEIGHT: 213 LBS | DIASTOLIC BLOOD PRESSURE: 66 MMHG | HEIGHT: 64 IN

## 2023-10-25 DIAGNOSIS — M47.812 CERVICAL SPONDYLOSIS WITHOUT MYELOPATHY: Primary | ICD-10-CM

## 2023-10-25 PROCEDURE — G8427 DOCREV CUR MEDS BY ELIG CLIN: HCPCS | Performed by: PAIN MEDICINE

## 2023-10-25 PROCEDURE — 99203 OFFICE O/P NEW LOW 30 MIN: CPT | Performed by: PAIN MEDICINE

## 2023-10-25 PROCEDURE — 1036F TOBACCO NON-USER: CPT | Performed by: PAIN MEDICINE

## 2023-10-25 PROCEDURE — G8417 CALC BMI ABV UP PARAM F/U: HCPCS | Performed by: PAIN MEDICINE

## 2023-10-25 PROCEDURE — G8484 FLU IMMUNIZE NO ADMIN: HCPCS | Performed by: PAIN MEDICINE

## 2023-10-25 RX ORDER — OXYCODONE HYDROCHLORIDE AND ACETAMINOPHEN 5; 325 MG/1; MG/1
1 TABLET ORAL EVERY 8 HOURS PRN
Qty: 15 TABLET | Refills: 0 | Status: SHIPPED | OUTPATIENT
Start: 2023-10-25 | End: 2023-10-30

## 2023-10-25 RX ORDER — BUPIVACAINE HYDROCHLORIDE 5 MG/ML
30 INJECTION, SOLUTION EPIDURAL; INTRACAUDAL ONCE
Status: COMPLETED | OUTPATIENT
Start: 2023-10-25 | End: 2023-10-25

## 2023-10-25 RX ORDER — BUPIVACAINE HYDROCHLORIDE 2.5 MG/ML
30 INJECTION, SOLUTION EPIDURAL; INFILTRATION; INTRACAUDAL ONCE
Status: SHIPPED | OUTPATIENT
Start: 2023-10-25

## 2023-10-25 RX ORDER — TRIAMCINOLONE ACETONIDE 40 MG/ML
40 INJECTION, SUSPENSION INTRA-ARTICULAR; INTRAMUSCULAR ONCE
Status: COMPLETED | OUTPATIENT
Start: 2023-10-25 | End: 2023-10-25

## 2023-10-25 RX ADMIN — TRIAMCINOLONE ACETONIDE 40 MG: 40 INJECTION, SUSPENSION INTRA-ARTICULAR; INTRAMUSCULAR at 11:53

## 2023-10-25 RX ADMIN — BUPIVACAINE HYDROCHLORIDE 150 MG: 5 INJECTION, SOLUTION EPIDURAL; INTRACAUDAL at 11:52

## 2023-10-25 ASSESSMENT — ENCOUNTER SYMPTOMS
ALLERGIC/IMMUNOLOGIC NEGATIVE: 1
RESPIRATORY NEGATIVE: 1
EYES NEGATIVE: 1
GASTROINTESTINAL NEGATIVE: 1

## 2023-10-25 NOTE — PROGRESS NOTES
Behavior: Behavior normal.         Thought Content: Thought content normal.         Judgment: Judgment normal.           Plan     Tried Intranasal Lidodaine with no relief. Offered Triggerpoint injection  Supine pojistion skin prepped with Betadine. 22G needle advanced lateral neck till bone encountered and injected 0.3cc Bupivacaine with touch of kenalog with 100% relief.

## 2023-10-27 ENCOUNTER — APPOINTMENT (OUTPATIENT)
Dept: PAIN MANAGEMENT | Facility: CLINIC | Age: 38
End: 2023-10-27

## 2023-10-28 ENCOUNTER — APPOINTMENT (OUTPATIENT)
Dept: PRIMARY CARE | Facility: CLINIC | Age: 38
End: 2023-10-28
Payer: COMMERCIAL

## 2023-11-08 ENCOUNTER — RX RENEWAL (OUTPATIENT)
Age: 38
End: 2023-11-08

## 2023-11-08 RX ORDER — NORTRIPTYLINE HYDROCHLORIDE 10 MG/1
10 CAPSULE ORAL
Qty: 60 | Refills: 0 | Status: ACTIVE | COMMUNITY
Start: 2022-08-09 | End: 1900-01-01

## 2023-11-13 ENCOUNTER — HOSPITAL ENCOUNTER (OUTPATIENT)
Dept: PHYSICAL THERAPY | Age: 38
Setting detail: THERAPIES SERIES
Discharge: HOME | End: 2023-11-13
Attending: PAIN MEDICINE
Payer: COMMERCIAL

## 2023-11-13 PROCEDURE — 97110 THERAPEUTIC EXERCISES: CPT

## 2023-11-13 PROCEDURE — 97161 PT EVAL LOW COMPLEX 20 MIN: CPT

## 2023-11-13 ASSESSMENT — PAIN SCALES - GENERAL: PAINLEVEL_OUTOF10: 0

## 2023-11-13 ASSESSMENT — PAIN DESCRIPTION - PAIN TYPE: TYPE: ACUTE PAIN

## 2023-11-13 ASSESSMENT — PAIN DESCRIPTION - LOCATION: LOCATION: NECK;HEAD

## 2023-11-13 ASSESSMENT — PAIN DESCRIPTION - DESCRIPTORS: DESCRIPTORS: PRESSURE

## 2023-11-13 NOTE — PROGRESS NOTES
[facility]) Physical Therapy-  [facility]   PHYSICAL THERAPY EVALUATION      Physical Therapy: Initial Evaluation    Patient: Zhanna Smalls (89 y.o.     female)   Examination Date:   Plan of Care Certification Period: 2023 to        :  1985 ;    Confirmed: Yes MRN: [id number]  CSN: [id number]   Insurance: Payor: Jany Rape / Plan: [address] [place] [address] / Product Type: *No Product type* /   Insurance ID: [id number] - (Commercial)  PT Insurance Information: [facility]  Secondary Insurance (if applicable):    Referring Physician: Clara De La Torre MD     PCP: Jose Manuel Collier MD Visits to Date/Visits Approved:     No Show/Cancelled Appts: 0      Medical Diagnosis: Cervical spondylosis without myelopathy [M47.812] Cervical spondylosis without myelopathy  Treatment Diagnosis: headaches, neck pain, tinnitus, difficulty with IADLs     PERTINENT MEDICAL HISTORY           Medical History: Chart Reviewed: Yes No past medical history on file. Surgical History: No past surgical history on file. Medications:   Current Outpatient Medications:     ondansetron (ZOFRAN) 4 MG tablet, Take 1 tablet by mouth 3 times daily as needed for Nausea or Vomiting, Disp: 30 tablet, Rfl: 1    indomethacin (INDOCIN) 50 MG capsule, Take 1 capsule by mouth 3 times daily as needed for Pain (headache and neck pain), Disp: 60 capsule, Rfl: 3    SUMAtriptan (IMITREX) 50 MG tablet, Take 1 tablet by mouth daily as needed for Migraine. If headache reoccurs, may repeat dose x1 after 2 hrs.  Maximum dose 2 tablets in 24 hrs., Disp: 9 tablet, Rfl: 0    tiZANidine (ZANAFLEX) 4 MG tablet, Take 1 tablet by mouth nightly as needed (neck spasm), Disp: 10 tablet, Rfl: 0    metoclopramide (REGLAN) 10 MG tablet, Take 1 tablet by mouth in the morning and at bedtime for 2 days, Disp: 4 tablet, Rfl: 0    Current Facility-Administered Medications:     bupivacaine (PF) (MARCAINE) 0.25 % injection 75 mg, 30
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
## 2023-11-30 ENCOUNTER — HOSPITAL ENCOUNTER (OUTPATIENT)
Dept: PHYSICAL THERAPY | Age: 38
Setting detail: THERAPIES SERIES
Discharge: HOME | End: 2023-11-30
Attending: PAIN MEDICINE
Payer: COMMERCIAL

## 2023-11-30 NOTE — PROGRESS NOTES
Therapy                            Cancellation/No-show Note    Date: 2023  Patient: Brodie Lauren (89 y.o. female)  : 1985  MRN:  [id number]  Referring Physician: Candace Rubi MD    Medical Diagnosis: Cervical spondylosis without myelopathy [M47.812] Cervical spondylosis without myelopathy    Visit Information:  Insurance: Payor: Ubaldo Gregory / Plan: [employer] / Product Type: *No Product type* /   Visits to Date: 1   No Show/Cancelled Appts:       For today's appointment patient:  []  Cancelled  []  Rescheduled appointment  [x]  No-show   [x]  Called pt to remind of next appointment     Reason given by patient:  []  Patient ill  []  Conflicting appointment  []  No transportation    []  Conflict with work  []  No reason given  [x]  Other:  LM    [] Pt has future appointments scheduled, no follow up needed  [] Pt requests to be on hold.     Reason:   If > 2 weeks please discuss with therapist.  [x] Therapist to call pt for follow up       Signature: Electronically signed by Kaylee Reynolds PT on 23 at 8:14 AM EST

## 2024-02-02 ENCOUNTER — APPOINTMENT (OUTPATIENT)
Dept: ORTHOPEDIC SURGERY | Facility: CLINIC | Age: 39
End: 2024-02-02

## 2024-06-18 NOTE — ED PROVIDER NOTE - PATIENT PORTAL LINK FT
Sonu Lopez  Internal Medicine  [address] [place] [address]  Phone: [phone]  Fax: [phone]  Follow Up Time: Urgent  
You can access the FollowMyHealth Patient Portal offered by [facility] by registering at the following website: [email or web address]. By joining [facility]’s FollowMyHealth portal, you will also be able to view your health information using other applications (apps) compatible with our system.

## 2024-06-28 ENCOUNTER — APPOINTMENT (OUTPATIENT)
Dept: PAIN MANAGEMENT | Facility: CLINIC | Age: 39
End: 2024-06-28
Payer: MEDICAID

## 2024-06-28 DIAGNOSIS — S82.892D OTHER FRACTURE OF LEFT LOWER LEG, SUBSEQUENT ENCOUNTER FOR CLOSED FRACTURE WITH ROUTINE HEALING: ICD-10-CM

## 2024-06-28 PROCEDURE — 99214 OFFICE O/P EST MOD 30 MIN: CPT
